# Patient Record
Sex: FEMALE | Race: WHITE | NOT HISPANIC OR LATINO | Employment: FULL TIME | URBAN - METROPOLITAN AREA
[De-identification: names, ages, dates, MRNs, and addresses within clinical notes are randomized per-mention and may not be internally consistent; named-entity substitution may affect disease eponyms.]

---

## 2017-01-09 ENCOUNTER — ALLSCRIPTS OFFICE VISIT (OUTPATIENT)
Dept: OTHER | Facility: OTHER | Age: 22
End: 2017-01-09

## 2017-03-27 ENCOUNTER — ALLSCRIPTS OFFICE VISIT (OUTPATIENT)
Dept: OTHER | Facility: OTHER | Age: 22
End: 2017-03-27

## 2017-06-09 ENCOUNTER — ALLSCRIPTS OFFICE VISIT (OUTPATIENT)
Dept: OTHER | Facility: OTHER | Age: 22
End: 2017-06-09

## 2017-06-09 DIAGNOSIS — N94.6 DYSMENORRHEA: ICD-10-CM

## 2017-06-12 ENCOUNTER — HOSPITAL ENCOUNTER (OUTPATIENT)
Dept: ULTRASOUND IMAGING | Facility: HOSPITAL | Age: 22
Discharge: HOME | End: 2017-06-12
Attending: OBSTETRICS & GYNECOLOGY
Payer: COMMERCIAL

## 2017-06-12 DIAGNOSIS — N94.6 DYSMENORRHEA: ICD-10-CM

## 2017-06-12 PROCEDURE — 76830 TRANSVAGINAL US NON-OB: CPT

## 2017-06-12 PROCEDURE — 76856 US EXAM PELVIC COMPLETE: CPT

## 2017-06-15 ENCOUNTER — GENERIC CONVERSION - ENCOUNTER (OUTPATIENT)
Dept: OTHER | Facility: OTHER | Age: 22
End: 2017-06-15

## 2017-07-11 ENCOUNTER — ALLSCRIPTS OFFICE VISIT (OUTPATIENT)
Dept: OTHER | Facility: OTHER | Age: 22
End: 2017-07-11

## 2017-08-22 ENCOUNTER — ALLSCRIPTS OFFICE VISIT (OUTPATIENT)
Dept: OTHER | Facility: OTHER | Age: 22
End: 2017-08-22

## 2017-08-31 ENCOUNTER — ALLSCRIPTS OFFICE VISIT (OUTPATIENT)
Dept: OTHER | Facility: OTHER | Age: 22
End: 2017-08-31

## 2017-12-31 NOTE — PROGRESS NOTES
Assessment    1  Migraine with aura and without status migrainosus, not intractable (346 00) (G43 109)   2  Hemiplegic migraine (346 30) (G43 409)    Plan  Hemiplegic migraine    · Follow-up visit in 6 months Evaluation and Treatment  Follow-up  Status: Complete   Done: [id number]   Ordered; For: Hemiplegic migraine; Ordered By: Consuelo Reyes Performed:  Due: [id number]; Last Updated By: Lana Hansen; 8/31/2017 2:53:52 PM    Discussion/Summary  Discussion Summary:   Pt reports that she is doing well  She will continue daily magnesium  She will follow-up in 6 months  Headache [facility]:   The patient was counseled regarding; There are no changes in medication management  Patient education was completed today and we also discussed precautions for rebound headaches  When patient has a moderate to severe headache, they should seek rest, initiate relaxation and apply cold compresses to the head  Also recommended to the patient :  1  Maintain regular sleep schedule  2  Limit over the counter medications  (No more than 3 times a week)  3  Maintain headache diary  4  Limit caffeine to 1-2 cups a day or less  5  Avoid dietary trigger  (list given to the patient and reviewed with them)  6  Patient is to have regular frequent meals to prevent headache onset  Chief Complaint  Chief Complaint Free Text Note Form: Patient is here today for a follow up for her headaches      History of Present Illness  HPI: We had the pleasure of evaluating Gary Car in neurological consultation today  As you know she is a 22year old right handed female  She is in school for mechanical engineering  She is taking daily magnesium and tolerating it well  She has had less headaches but is not in school and has less stress  She is concerned that the headaches will increase once school starts up again      Any family history of aneurysms - no  Family history of migraine headaches - mother and maternal grandmother     What is your current pain level - 0/10   Headaches started at what age - 14years of age headaches started and started her menstruation at age 7years of age  Accident or injury prior to onset of headaches - 12years of age, she was unresponsive for less than 2-3 minutes  She was seen in the ER and had a CT head  She was vomiting at the time  It seems all her symptoms improved in 2-3 days and did not play sports during that time  How often do the headaches occur - <1 migraine per month  What time of the day do the headaches start - usually in the morning when she wakes up  How long do the headaches last - 1-2 hours to all day at time  Are you ever headache free - yes  Where are they located - right orbital   What is the intensity of pain - 5/10  Any warning prior to headache and how long do they last - with every headache she has warning  Typical it is bilateral hand numbness lasting 20 to 30 minutes  - Visual symptoms which start with loss of vision on the right side of her visual field on the right eye only and at time moves to the left side  This occurred more often when she was younger    - Her typically aura now consist of hand numbness with speech problem (difficulty expressing herself and trouble getting her words out) which last for 20 to 30 minutes  - In college she had one event with left side of the face was numbness and this lasted for 20 minutes and then she had a headache  - Aug of 2016 she has numbness with cramps on her hands  May of 2016- had had numbness and had difficulty writing her email  States was using strange long words and had hard time expressing herself  When symptoms subsided she could not understand what she was writing  This lasted for about 20 minutes to half an hours and then the headache started     Describe your usual headache - Throbbing, aching and stabbing  Associated symptoms:   - Decrease of appetite, nausea, vomiting, diarrhea  - Photophobia  - Problem with concentration  - light-headed or dizzy, stiff or sore neck,   - prefer to be alone and in a dark room, unable to work  Headache are worse if the patient: cough, sneeze, bending over  Headache trigger: Fatigue, Stress/Tension, exercise, lack of sleep, dehydration  Are you current pregnant or planning on getting pregnant? no  What time of the year do headaches occur more frequently - varies  Have you seen someone else for headaches or pain - pcp  Have you had trigger point injection performed and how often - no  Have you had Botox injection performed and how often - no  Have you had epidural injections or transforaminal injections performed - no  What medications do you take or have you taken for your headaches? PREVENTIVE: Magnesium  ABORTIVE: Advil  Non-Medical/Alternative Treatments used in the past for headaches: none     Reviewed PMH, PSH, SH, FH and ROS  Review of Systems  Neurological ROS:   Constitutional: no fever, no chills, no recent weight gain, no recent weight loss, no complaints of feeling tired, no changes in appetite  HEENT:  no sinus problems, not feeling congested, no blurred vision, no dryness of the eyes, no eye pain, no hearing loss, no tinnitus, no mouth sores, no sore throat, no hoarseness, no dysphagia, no masses, no bleeding  Cardiovascular:  no chest pain or pressure, no palpitations present, the heart rate was not rapid or irregular, no swelling in the arms or legs, no poor circulation  Respiratory:  no unusual or persistant cough, no shortness of breath with or without exertion  Gastrointestinal:  no nausea, no vomiting, no diarrhea, no abdominal pain, no changes in bowel habits, no melena, no loss of bowel control  Genitourinary:  no incontinence, no feelings of urinary urgency, no increase in frequency, no urinary hesitancy, no dysuria, no hematuria     Musculoskeletal:  no arthralgias, no myalgias, no immobility or loss of function, no head/neck/back pain, no pain while walking  Integumentary  no masses, no rash, no skin lesions, no livedo reticularis  Psychiatric:  no anxiety, no depression, no mood swings, no psychiatric hospitalizations, no sleep problems  Endocrine  no unusual weight loss or gain, no excessive urination, no excessive thirst, no hair loss or gain, no hot or cold intolerance, no menstrual period change or irregularity, no loss of sexual ability or drive, no erection difficulty, no nipple discharge  Hematologic/Lymphatic:  no unusual bleeding, no tendency for easy bruising, no clotting skin or lumps  Neurological General: headache and lightheadedness  Neurological Mental Status: memory problems   no confusion, no mood swings, no alteration or loss of consciousness, no difficulty expressing/understanding speech, no memory problems  Neurological Cranial Nerves: loss of vision, facial numbness or weakness, vertigo or dizziness, slurred speech and left side at times both sides  Neurological Motor findings include:  no tremor, no twitching, no cramping(pre/post exercise), no atrophy  Neurological Coordination:  no unsteadiness, no vertigo or dizziness, no clumsiness, no problems reaching for objects  Neurological Sensory: numbness, tingling and tinglings arms, hands and bilat feet  Neurological Gait:  no difficulty walking, not falling to one side, no sensation of being pushed, has not had falls  ROS Reviewed:   ROS reviewed  Active Problems    1  Acne (706 1) (L70 9)   2  Allergic rhinitis (477 9) (J30 9)   3  Dysfunctional uterine bleeding (626 8) (N93 8)   4  Dysmenorrhea (625 3) (N94 6)   5  Encounter for IUD insertion (V25 11) (Z30 430)   6  Hemiplegic migraine (346 30) (G43 409)   7  Migraine with aura and without status migrainosus, not intractable (346 00) (G43 109)   8  Need for prophylactic vaccination and inoculation against influenza (V04 81) (Z23)   9  Well adult exam (V70 0) (Z00 00)    Past Medical History    1   History of acute sinusitis (V12 69) (Z87 09)    Surgical History    1  Denied: History Of Prior Surgery    Family History  Mother    1  Family history of migraine headaches (V17 2) (Z82 0)  Family History    2  Family history of migraine headaches (V17 2) (Z82 0)    Social History    · Caffeine use (V49 89) (F15 90)   · College student   · Completed 12th grade   · Denied: History of drug use   · Lives with family   · Never a smoker   · Never smoked cigarettes (V49 89) (Z78 9)   · No alcohol use   · Single    Current Meds   1  Magnesium 250 MG Oral Tablet; TAKE 1 TABLET DAILY; Therapy: (Recorded:32Qnp4331) to Recorded    Allergies    1  No Known Drug Allergies    Vitals  Signs   Recorded: 14Uwn7901 02:30PM   Heart Rate: 80  Respiration: 18  Systolic: 844  Diastolic: 73  Height: 5 ft 6 in  Weight: 124 lb 1 oz  BMI Calculated: 20 02  BSA Calculated: 1 63  O2 Saturation: 98    Physical Exam    Constitutional   General appearance: No acute distress, well appearing and well nourished  Eyes   Ophthalmoscopic examination: Vision is grossly normal  Gross visual field testing by confrontation shows no abnormalities  EOMI in both eyes  Conjunctivae clear  Eyelids normal palpebral fissures equal  Orbits exhibit normal position  No discharge from the eyes  PERRL  Musculoskeletal   Gait and station: Normal gait, stance and balance  Muscle strength: Normal strength throughout  Muscle tone: No atrophy, abnormal movements, flaccidity, cogwheeling or spasticity  Involuntary movements: None observed      Neurologic   Orientation to person, place, and time: Normal     2nd cranial nerve: Normal     3rd, 4th, and 6th cranial nerves: Normal     5th cranial nerve: Normal     7th cranial nerve: Normal     8th cranial nerve: Normal     9th cranial nerve: Normal     11th cranial nerve: Normal     12th cranial nerve: Normal     Sensation: Normal     Reflexes: Normal     Coordination: Normal        Future Appointments    Date/Time Provider Specialty Site   06/12/2018 09:00 AM Isael Payton DO Family Medicine [facility] [place]   03/28/2018 09:45 AM Kris Leon, [facility] Neurology [email or web address]   03/29/2018 01:40 PM LEXY Warren   Obstetrics/Gynecology [facility] OB     Signatures   Electronically signed by : Roxane Starr [facility]; Sep  6 2017  9:03AM EST                       (Author)

## 2018-01-10 NOTE — MISCELLANEOUS
Message   Recorded as Task   Date: 06/12/2017 09:09 AM, Created By: Camille Hatfield   Task Name: Care Coordination   Assigned To: Jareth Portillo   Regarding Patient: Kalli Singh, Status: Active   CommentVallimarcial Senate - 12 Jun 2017 9:09 AM     TASK CREATED  mirena iud reimbursement paperwork   Jareth Portillo - 14 Jun 2017 8:27 AM     TASK EDITED  Fax Mirena form today will check when I come back  Jareth Portillo - 21 JBL 9816 8:27 AM     TASK EDITED   Jareth Portillo - 27 Jun 2017 9:54 AM     TASK EDITED  Saturnino Montes De Oca said is a buy and bill please call pt and schedule an appt  Active Problems    1  Acne (706 1) (L70 9)   2  Allergic rhinitis (477 9) (J30 9)   3  Dysfunctional uterine bleeding (626 8) (N93 8)   4  Dysmenorrhea (625 3) (N94 6)   5  Hemiplegic migraine (346 30) (G43 409)   6  Migraine with aura and without status migrainosus, not intractable (346 00) (G43 109)   7  Need for prophylactic vaccination and inoculation against influenza (V04 81) (Z23)   8  Well adult exam (V70 0) (Z00 00)    Current Meds   1  Naproxen 500 MG Oral Tablet; TAKE 1 TABLET 3 TIMES DAILY AS NEEDED; Therapy: 27OUO5596 to (Evaluate:29Jun2017)  Requested for: 45AUK0784; Last   Rx:09Jun2017 Ordered    Allergies    1   No Known Drug Allergies    Signatures   Electronically signed by : Michelle Garay MA; Jun 27 2017  9:54AM EST                       (Author)

## 2018-01-12 VITALS
WEIGHT: 128 LBS | SYSTOLIC BLOOD PRESSURE: 100 MMHG | DIASTOLIC BLOOD PRESSURE: 70 MMHG | BODY MASS INDEX: 20.57 KG/M2 | HEIGHT: 66 IN

## 2018-01-12 VITALS — DIASTOLIC BLOOD PRESSURE: 74 MMHG | BODY MASS INDEX: 19.99 KG/M2 | WEIGHT: 122 LBS | SYSTOLIC BLOOD PRESSURE: 110 MMHG

## 2018-01-12 VITALS
SYSTOLIC BLOOD PRESSURE: 110 MMHG | DIASTOLIC BLOOD PRESSURE: 70 MMHG | BODY MASS INDEX: 19.77 KG/M2 | HEIGHT: 66 IN | WEIGHT: 123 LBS

## 2018-01-13 VITALS
OXYGEN SATURATION: 98 % | WEIGHT: 124.06 LBS | HEIGHT: 66 IN | HEART RATE: 80 BPM | SYSTOLIC BLOOD PRESSURE: 105 MMHG | BODY MASS INDEX: 19.94 KG/M2 | RESPIRATION RATE: 18 BRPM | DIASTOLIC BLOOD PRESSURE: 73 MMHG

## 2018-01-13 VITALS
BODY MASS INDEX: 20.33 KG/M2 | OXYGEN SATURATION: 98 % | SYSTOLIC BLOOD PRESSURE: 100 MMHG | DIASTOLIC BLOOD PRESSURE: 78 MMHG | HEART RATE: 74 BPM | WEIGHT: 124.03 LBS

## 2018-01-13 VITALS
BODY MASS INDEX: 20.57 KG/M2 | SYSTOLIC BLOOD PRESSURE: 110 MMHG | DIASTOLIC BLOOD PRESSURE: 80 MMHG | HEIGHT: 66 IN | WEIGHT: 128 LBS

## 2018-03-26 PROBLEM — G43.409 HEMIPLEGIC MIGRAINE: Status: ACTIVE | Noted: 2017-01-09

## 2018-03-28 ENCOUNTER — OFFICE VISIT (OUTPATIENT)
Dept: NEUROLOGY | Facility: CLINIC | Age: 23
End: 2018-03-28
Payer: COMMERCIAL

## 2018-03-28 VITALS
HEART RATE: 66 BPM | WEIGHT: 128 LBS | BODY MASS INDEX: 20.66 KG/M2 | DIASTOLIC BLOOD PRESSURE: 78 MMHG | SYSTOLIC BLOOD PRESSURE: 112 MMHG

## 2018-03-28 DIAGNOSIS — G43.409 HEMIPLEGIC MIGRAINE WITHOUT STATUS MIGRAINOSUS, NOT INTRACTABLE: ICD-10-CM

## 2018-03-28 DIAGNOSIS — G43.109 MIGRAINE WITH AURA AND WITHOUT STATUS MIGRAINOSUS, NOT INTRACTABLE: Primary | ICD-10-CM

## 2018-03-28 PROCEDURE — 99213 OFFICE O/P EST LOW 20 MIN: CPT | Performed by: PHYSICIAN ASSISTANT

## 2018-03-28 RX ORDER — MULTIVITAMIN WITH IRON
1 TABLET ORAL DAILY
COMMUNITY

## 2018-03-28 NOTE — ASSESSMENT & PLAN NOTE
Pt reports that she is doing well  She is no longer having auras and only has a migraine every 3 months  Advil is effective for treatment  She will continue daily magnesium for migraine prevention  She will follow-up as needed

## 2018-03-28 NOTE — PATIENT INSTRUCTIONS
Migraine with aura and without status migrainosus, not intractable  Pt reports that she is doing well  She is no longer having auras and only has a migraine every 3 months  Advil is effective for treatment  She will continue daily magnesium for migraine prevention  She will follow-up as needed

## 2018-03-28 NOTE — PROGRESS NOTES
Patient ID: Geoluz maria Snyder is a 25 y o  female  Assessment/Plan:    Migraine with aura and without status migrainosus, not intractable  Pt reports that she is doing well  She is no longer having auras and only has a migraine every 3 months  Advil is effective for treatment  She will continue daily magnesium for migraine prevention  She will follow-up as needed  Problem List Items Addressed This Visit     Migraine with aura and without status migrainosus, not intractable - Primary     Pt reports that she is doing well  She is no longer having auras and only has a migraine every 3 months  Advil is effective for treatment  She will continue daily magnesium for migraine prevention  She will follow-up as needed  Hemiplegic migraine             Subjective:    HPI    We had the pleasure of evaluating Wisam Joy in neurological consultation today  As you know she is a 25year old right handed female  She is in school for mechanical engineering  She is taking daily magnesium and tolerating it well  She has had less headaches and is back in class without an increase  Any family history of aneurysms - no  Family history of migraine headaches - mother and maternal grandmother     What is your current pain level - 0/10   Headaches started at what age - 14years of age headaches started and started her menstruation at age 7years of age  Accident or injury prior to onset of headaches - 12years of age, she was unresponsive for less than 2-3 minutes  She was seen in the ER and had a CT head  She was vomiting at the time  It seems all her symptoms improved in 2-3 days and did not play sports during that time     How often do the headaches occur - 1 migraine every 3 months  What time of the day do the headaches start - usually in the middle of the day  How long do the headaches last - 1-2 hours to all day at time  Are you ever headache free - yes  Where are they located - frontal  What is the intensity of pain - 5-6/10  Any warning prior to headache and how long do they last - aura has not occurred with the most recent migraines  Previously pt reported:   - Visual symptoms which start with loss of vision on the right side of her visual field on the right eye only and at time moves to the left side  This occurred more often when she was younger    - Her typically aura now consist of hand numbness with speech problem (difficulty expressing herself and trouble getting her words out) which last for 20 to 30 minutes  - In college she had one event with left side of the face was numbness and this lasted for 20 minutes and then she had a headache  - Aug of 2016 she has numbness with cramps on her hands  May of 2016- had had numbness and had difficulty writing her email  States was using strange long words and had hard time expressing herself  When symptoms subsided she could not understand what she was writing  This lasted for about 20 minutes to half an hours and then the headache started  Describe your usual headache - Throbbing, aching and stabbing  Associated symptoms:   - Decrease of appetite, nausea, vomiting, diarrhea  - Photophobia  - Problem with concentration  - light-headed or dizzy, stiff or sore neck,   - prefer to be alone and in a dark room, unable to work  Headache are worse if the patient: cough, sneeze, bending over  Headache trigger: Fatigue, Stress/Tension, exercise, lack of sleep, dehydration  Are you current pregnant or planning on getting pregnant? no  What time of the year do headaches occur more frequently - varies  Have you seen someone else for headaches or pain - pcp  Have you had trigger point injection performed and how often - no  Have you had Botox injection performed and how often - no  Have you had epidural injections or transforaminal injections performed - no  What medications do you take or have you taken for your headaches?    PREVENTIVE: Magnesium  ABORTIVE: Advil  Non-Medical/Alternative Treatments used in the past for headaches: none    The following portions of the patient's history were reviewed and updated as appropriate: allergies, current medications, past family history, past medical history, past social history, past surgical history and problem list          Objective:    Blood pressure 112/78, pulse 66, weight 58 1 kg (128 lb)  Physical Exam   Eyes: EOM are normal  Pupils are equal, round, and reactive to light  Neurological: She has normal strength and normal reflexes  Gait normal    Psychiatric: Her speech is normal    Vitals reviewed  Neurological Exam    Mental Status  The patient is alert and oriented to person, place, time, and situation  Her recent and remote memory are normal  Her speech is normal  Her language is fluent with no aphasia  Cranial Nerves    CN II: The patient's visual acuity and visual fields are normal   CN III, IV, VI: The patient's pupils are equally round and reactive to light and ocular movements are normal   CN V: The patient has normal facial sensation  CN VII:  The patient has symmetric facial movement  CN VIII:  The patient's hearing is normal   CN IX, X: The patient has symmetric palate movement and normal gag reflex  CN XI: The patient's shoulder shrug strength is normal   CN XII: The patient's tongue is midline without atrophy or fasciculations  Motor   Her strength is 5/5 throughout all four extremities  Sensory  The patient's sensation is normal in all four extremities  Reflexes  Deep tendon reflexes are 2+ and symmetric in all four extremities with downgoing toes bilaterally  Gait and Coordination  The patient has normal gait and station  ROS:    Review of Systems   Constitutional: Negative  HENT: Negative  Eyes: Negative  Respiratory: Negative  Cardiovascular: Negative  Gastrointestinal: Negative  Endocrine: Negative  Genitourinary: Negative      Musculoskeletal: Negative  Skin: Negative  Allergic/Immunologic: Negative  Neurological: Positive for headaches  Hematological: Negative  Psychiatric/Behavioral: Negative

## 2018-03-29 ENCOUNTER — ANNUAL EXAM (OUTPATIENT)
Dept: OBGYN CLINIC | Facility: CLINIC | Age: 23
End: 2018-03-29
Payer: COMMERCIAL

## 2018-03-29 VITALS
HEIGHT: 66 IN | BODY MASS INDEX: 19.61 KG/M2 | DIASTOLIC BLOOD PRESSURE: 60 MMHG | WEIGHT: 122 LBS | SYSTOLIC BLOOD PRESSURE: 100 MMHG

## 2018-03-29 DIAGNOSIS — Z01.419 ENCOUNTER FOR ANNUAL ROUTINE GYNECOLOGICAL EXAMINATION: Primary | ICD-10-CM

## 2018-03-29 PROCEDURE — S0612 ANNUAL GYNECOLOGICAL EXAMINA: HCPCS | Performed by: OBSTETRICS & GYNECOLOGY

## 2018-03-29 PROCEDURE — G0145 SCR C/V CYTO,THINLAYER,RESCR: HCPCS | Performed by: OBSTETRICS & GYNECOLOGY

## 2018-03-29 NOTE — PROGRESS NOTES
Assessment/Plan:    Encounter for annual routine gynecological examination  -     Liquid-based pap, screening    Other orders  -     levonorgestrel (MIRENA) 20 MCG/24HR IUD; 1 each by Intrauterine route once        Subjective:      Patient ID: Isael Aguiar is a 25 y o  female  26-year-old here for yearly exam   She is finishing up her 3rd year at [employer]  She has an IUD in place to help with her bleeding  She notes that her cycles are slightly longer but on the lighter side  Her headaches mother neurologic issues have completely resolved with the discontinuation of birth control pills  She is curious to know if she can use a diva cup for her menstrual cycle which I told her she could  She denies issues with urination or moving her bowels  She has no breast concerns today  She has no pelvic pain today  She has occasional mild cramping pre menstrual cycle  She has never been sexually active  The following portions of the patient's history were reviewed and updated as appropriate: allergies, current medications, past family history, past medical history, past social history, past surgical history and problem list     Review of Systems   Respiratory: Negative for shortness of breath  Cardiovascular: Negative for chest pain  Gastrointestinal: Negative for abdominal pain, constipation and diarrhea  Genitourinary: Negative for difficulty urinating, menstrual problem, pelvic pain, vaginal bleeding, vaginal discharge and vaginal pain  Neurological: Negative for dizziness and headaches  Objective:      /60   Ht 5' 5 5" (1 664 m)   Wt 55 3 kg (122 lb)   LMP 03/16/2018   BMI 19 99 kg/m²          Physical Exam   Constitutional: She is oriented to person, place, and time  She appears well-developed and well-nourished  Pulmonary/Chest: No respiratory distress  Right breast exhibits no inverted nipple, no mass, no nipple discharge, no skin change and no tenderness   Left breast exhibits no inverted nipple, no mass, no nipple discharge, no skin change and no tenderness  Tattoo under left breast   Abdominal: Soft  There is no tenderness  Genitourinary: Vagina normal and uterus normal  There is no rash or lesion on the right labia  There is no rash or lesion on the left labia  Uterus is not enlarged and not tender  Cervix exhibits friability  Cervix exhibits no motion tenderness  Right adnexum displays no mass and no tenderness  Left adnexum displays no mass and no tenderness  No bleeding in the vagina  No signs of injury around the vagina  No vaginal discharge found  Musculoskeletal: Normal range of motion  She exhibits edema  Neurological: She is alert and oriented to person, place, and time  Skin: Skin is warm and dry  Psychiatric: She has a normal mood and affect  Vitals reviewed

## 2018-04-04 LAB
LAB AP GYN PRIMARY INTERPRETATION: NORMAL
Lab: NORMAL

## 2018-08-23 ENCOUNTER — CLINICAL SUPPORT (OUTPATIENT)
Dept: FAMILY MEDICINE CLINIC | Facility: CLINIC | Age: 23
End: 2018-08-23
Payer: COMMERCIAL

## 2018-08-23 DIAGNOSIS — Z23 NEED FOR VACCINATION AGAINST HUMAN PAPILLOMAVIRUS: Primary | ICD-10-CM

## 2018-08-23 PROCEDURE — 90651 9VHPV VACCINE 2/3 DOSE IM: CPT

## 2018-08-23 PROCEDURE — 90471 IMMUNIZATION ADMIN: CPT

## 2019-02-12 ENCOUNTER — TELEPHONE (OUTPATIENT)
Dept: NEUROLOGY | Facility: CLINIC | Age: 24
End: 2019-02-12

## 2019-02-12 NOTE — TELEPHONE ENCOUNTER
Patient called in tearfully stating she has hemiplegic migraines and she is experiencing one presently  She reports she has an examination today and she is requesting a note stating she is under our care excusing her from missing the exam      When did migraine start? 30 mins ago  Location/Description: no pain at this point, usually starts with numbness of right hand and right side of face, difficulty expressing words,    Pain scale: 0  Associated symptoms:"split" in right eye vision  Precipitating factors: usually not getting enough rest or drinking enough water  Alleviating factors: increases fluid intake, resting, and taking advil usually resolves it  What medications have you tried for this migraine headache? 2 tabs of Advil     Current migraine medications are confirmed as:  Magnesium 250mg daily    Patient is not requesting any medication at this point as typically resolves with above alleviating factors  Patient advised that we would recommend she seek emergency care should her symptoms worsen or not improve  She verbalized clear understanding  Please advise if okay to write letter stating:     "To Whom it May Concern,     Deshawn Castro is under the care of [facility]  Please excuse Austen Fabys from her examination on 2/12/19 due to her medical condition "    Patient unaware of any fax number that letter can be sent to at this time  Will look into obtaining fax number but requests we send via mail to below address if approved by provider  Requests provider signature      Current Address:   [address] [address], [address] [place], [address]

## 2019-02-12 NOTE — LETTER
To whom it may concern,     Brendan Knapp, 1995, is under the care of [facility] [address] Neurology  Please excuse Rachel Islas from her examination on 2/12/19 due to her medical condition       Sincerely,        Daksha Hobbs MD

## 2019-02-18 NOTE — TELEPHONE ENCOUNTER
Letter drafted for signature  Please mail to address in message and let patient know once complete  Thank you!

## 2019-07-17 ENCOUNTER — ANNUAL EXAM (OUTPATIENT)
Dept: OBGYN CLINIC | Facility: CLINIC | Age: 24
End: 2019-07-17
Payer: COMMERCIAL

## 2019-07-17 VITALS
HEIGHT: 66 IN | DIASTOLIC BLOOD PRESSURE: 60 MMHG | BODY MASS INDEX: 21.69 KG/M2 | WEIGHT: 135 LBS | SYSTOLIC BLOOD PRESSURE: 114 MMHG

## 2019-07-17 DIAGNOSIS — Z01.419 ENCOUNTER FOR GYNECOLOGICAL EXAMINATION (GENERAL) (ROUTINE) WITHOUT ABNORMAL FINDINGS: Primary | ICD-10-CM

## 2019-07-17 PROCEDURE — S0612 ANNUAL GYNECOLOGICAL EXAMINA: HCPCS | Performed by: OBSTETRICS & GYNECOLOGY

## 2019-07-17 NOTE — PROGRESS NOTES
Elidia Morning  1995    CC:  Yearly exam    S:  26 y o  female here for yearly exam   She gets occasional light bleeding with her Mirena, but nothing heavy or bothersome  She is not sexually active  She does not request STD testing today  One more year of school - , working as Co-Op at [employer]  Hopes to move back to [facility] when she graduates       Last Pap 2018 - normal cytology    Current Outpatient Medications:     levonorgestrel (MIRENA) 20 MCG/24HR IUD, 1 each by Intrauterine route once, Disp: , Rfl:     Magnesium 250 MG TABS, Take 1 tablet by mouth daily, Disp: , Rfl:   Social History     Socioeconomic History    Marital status: Single     Spouse name: Not on file    Number of children: Not on file    Years of education: college student    Highest education level: Not on file   Occupational History    Not on file   Social Needs    Financial resource strain: Not on file    Food insecurity:     Worry: Not on file     Inability: Not on file    Transportation needs:     Medical: Not on file     Non-medical: Not on file   Tobacco Use    Smoking status: Never Smoker    Smokeless tobacco: Never Used   Substance and Sexual Activity    Alcohol use: No    Drug use: No    Sexual activity: Never     Birth control/protection: IUD     Comment: Mirena   Lifestyle    Physical activity:     Days per week: Not on file     Minutes per session: Not on file    Stress: Not on file   Relationships    Social connections:     Talks on phone: Not on file     Gets together: Not on file     Attends Protestant service: Not on file     Active member of club or organization: Not on file     Attends meetings of clubs or organizations: Not on file     Relationship status: Not on file    Intimate partner violence:     Fear of current or ex partner: Not on file     Emotionally abused: Not on file     Physically abused: Not on file     Forced sexual activity: Not on file   Other Topics Concern    Not on file   Social History Narrative    Caffeine use    Lives with family     Family History   Problem Relation Age of Onset   Harvey Roman Migraines Mother     Migraines Family     Migraines Sister      Past Medical History:   Diagnosis Date    Migraine          O:  Blood pressure 114/60, height 5' 6" (1 676 m), weight 61 2 kg (135 lb), last menstrual period 06/03/2019  Patient appears well and is not in distress  Neck is supple without masses  Breasts are symmetrical without mass, tenderness, nipple discharge, skin changes or adenopathy  Abdomen is soft and nontender without masses  External genitals are normal without lesions or rashes  Vagina is normal without discharge or bleeding  Cervix is normal without discharge or lesion IUD strings seen  Uterus is normal, mobile, nontender without palpable mass  Adnexa are normal, nontender, without palpable mass  A:  Yearly exam      P:  RTO one year for yearly exam or sooner as needed

## 2020-09-29 ENCOUNTER — ANNUAL EXAM (OUTPATIENT)
Dept: OBGYN CLINIC | Facility: CLINIC | Age: 25
End: 2020-09-29
Payer: COMMERCIAL

## 2020-09-29 VITALS
DIASTOLIC BLOOD PRESSURE: 72 MMHG | TEMPERATURE: 98.6 F | WEIGHT: 132 LBS | SYSTOLIC BLOOD PRESSURE: 124 MMHG | BODY MASS INDEX: 21.31 KG/M2

## 2020-09-29 DIAGNOSIS — Z97.5 IUD (INTRAUTERINE DEVICE) IN PLACE: ICD-10-CM

## 2020-09-29 DIAGNOSIS — Z01.419 ENCOUNTER FOR GYNECOLOGICAL EXAMINATION (GENERAL) (ROUTINE) WITHOUT ABNORMAL FINDINGS: Primary | ICD-10-CM

## 2020-09-29 PROCEDURE — S0612 ANNUAL GYNECOLOGICAL EXAMINA: HCPCS | Performed by: NURSE PRACTITIONER

## 2020-09-29 NOTE — ASSESSMENT & PLAN NOTE
Mirena IUD present since 2017 for management of menorrhagia in the context of h/o migraine with aura component  Pt likes the device and desires to continue  She does perform occasional self string check and declines pelvic exam today  Reviewed sx to report and reasons to call  She is aware condoms are recommended for STI prevention if sexual activity is initiated

## 2020-09-29 NOTE — PROGRESS NOTES
Assessment/Plan:    Encounter for gynecological examination (general) (routine) without abnormal findings  Normal findings on routine annual gyn exam  Recommended monthly SBE, annual CBE  Reviewed ASCCP guidelines and pap noted to be up to date  The patient reports she has completed Gardasil series  STI testing was offered and declined at this time, as she reports virginal status; the patient is aware that condoms are recommended for all sexual contact for prevention of STI  Reviewed diet/activity recommendations and reasons to call  F/u in one year for routine annual gyn exam or sooner PRN  IUD (intrauterine device) in place  Mirena IUD present since 2017 for management of menorrhagia in the context of h/o migraine with aura component  Pt likes the device and desires to continue  She does perform occasional self string check and declines pelvic exam today  Reviewed sx to report and reasons to call  She is aware condoms are recommended for STI prevention if sexual activity is initiated  Diagnoses and all orders for this visit:    Encounter for gynecological examination (general) (routine) without abnormal findings    IUD (intrauterine device) in place          Subjective:      Patient ID: Jhoana Victor is a 26 y o  female  This patient presents for routine annual gyn exam    Medically stable  H/o migraine with aura  Mirena present since 2017 for management of menorrhagia  She does still have occasional period - shorter and lighter than prior to IUD insert  Likes the device and desires to continue  She denies acute gyn complaints  She denies pelvic pain, breast concerns, abnormal discharge, bowel/bladder dysfunction, depression/anxiety     Single and virginal  Denies STI concerns     Recently graduated from [employer] and looking for a job      The following portions of the patient's history were reviewed and updated as appropriate: allergies, current medications, past family history, past medical history, past social history, past surgical history and problem list     Review of Systems   Constitutional: Negative  Respiratory: Negative  Cardiovascular: Negative  Gastrointestinal: Negative  Genitourinary: Negative  Musculoskeletal: Negative  Skin: Negative  Neurological: Negative  Psychiatric/Behavioral: Negative  Objective:      /72   Temp 98 6 °F (37 °C)   Wt 59 9 kg (132 lb)   LMP 09/14/2020   BMI 21 31 kg/m²          Physical Exam  Constitutional:       Appearance: She is well-developed  HENT:      Head: Normocephalic and atraumatic  Eyes:      Pupils: Pupils are equal, round, and reactive to light  Neck:      Musculoskeletal: Normal range of motion and neck supple  Thyroid: No thyromegaly  Cardiovascular:      Rate and Rhythm: Normal rate and regular rhythm  Heart sounds: Normal heart sounds  Pulmonary:      Effort: Pulmonary effort is normal  No respiratory distress  Breath sounds: Normal breath sounds  No wheezing or rales  Chest:      Chest wall: No mass, deformity or tenderness  Breasts: Breasts are symmetrical          Right: No inverted nipple, mass, nipple discharge, skin change or tenderness  Left: No inverted nipple, mass, nipple discharge, skin change or tenderness  Abdominal:      General: There is no distension  Palpations: Abdomen is soft  There is no hepatomegaly, splenomegaly or mass  Tenderness: There is no abdominal tenderness  There is no guarding or rebound  Genitourinary:        Musculoskeletal: Normal range of motion  Lymphadenopathy:      Cervical: No cervical adenopathy  Skin:     General: Skin is warm and dry  Findings: No rash  Nails: There is no clubbing  Neurological:      Mental Status: She is alert and oriented to person, place, and time  Cranial Nerves: No cranial nerve deficit     Psychiatric:         Speech: Speech normal          Behavior: Behavior normal  Thought Content:  Thought content normal          Judgment: Judgment normal

## 2020-09-29 NOTE — ASSESSMENT & PLAN NOTE
Normal findings on routine annual gyn exam  Recommended monthly SBE, annual CBE  Reviewed ASCCP guidelines and pap noted to be up to date  The patient reports she has completed Gardasil series  STI testing was offered and declined at this time, as she reports virginal status; the patient is aware that condoms are recommended for all sexual contact for prevention of STI  Reviewed diet/activity recommendations and reasons to call  F/u in one year for routine annual gyn exam or sooner PRN

## 2021-04-08 DIAGNOSIS — Z23 ENCOUNTER FOR IMMUNIZATION: ICD-10-CM

## 2021-10-14 ENCOUNTER — ANNUAL EXAM (OUTPATIENT)
Dept: OBGYN CLINIC | Facility: CLINIC | Age: 26
End: 2021-10-14
Payer: COMMERCIAL

## 2021-10-14 VITALS
DIASTOLIC BLOOD PRESSURE: 64 MMHG | BODY MASS INDEX: 21.6 KG/M2 | SYSTOLIC BLOOD PRESSURE: 108 MMHG | HEIGHT: 66 IN | WEIGHT: 134.4 LBS

## 2021-10-14 DIAGNOSIS — Z97.5 IUD (INTRAUTERINE DEVICE) IN PLACE: ICD-10-CM

## 2021-10-14 DIAGNOSIS — Z01.419 ENCOUNTER FOR GYNECOLOGICAL EXAMINATION (GENERAL) (ROUTINE) WITHOUT ABNORMAL FINDINGS: Primary | ICD-10-CM

## 2021-10-14 PROCEDURE — G0145 SCR C/V CYTO,THINLAYER,RESCR: HCPCS | Performed by: NURSE PRACTITIONER

## 2021-10-14 PROCEDURE — 99395 PREV VISIT EST AGE 18-39: CPT | Performed by: NURSE PRACTITIONER

## 2021-10-25 LAB
LAB AP GYN PRIMARY INTERPRETATION: NORMAL
Lab: NORMAL

## 2021-11-20 ENCOUNTER — OFFICE VISIT (OUTPATIENT)
Dept: URGENT CARE | Facility: CLINIC | Age: 26
End: 2021-11-20
Payer: COMMERCIAL

## 2021-11-20 VITALS
HEART RATE: 78 BPM | WEIGHT: 136 LBS | HEIGHT: 66 IN | BODY MASS INDEX: 21.86 KG/M2 | TEMPERATURE: 97.3 F | OXYGEN SATURATION: 99 % | SYSTOLIC BLOOD PRESSURE: 133 MMHG | DIASTOLIC BLOOD PRESSURE: 64 MMHG

## 2021-11-20 DIAGNOSIS — H01.001 BLEPHARITIS OF RIGHT UPPER EYELID, UNSPECIFIED TYPE: Primary | ICD-10-CM

## 2021-11-20 PROCEDURE — G0382 LEV 3 HOSP TYPE B ED VISIT: HCPCS | Performed by: NURSE PRACTITIONER

## 2021-11-20 RX ORDER — ERYTHROMYCIN 5 MG/G
0.5 OINTMENT OPHTHALMIC EVERY 8 HOURS SCHEDULED
Qty: 3.5 G | Refills: 0 | Status: SHIPPED | OUTPATIENT
Start: 2021-11-20 | End: 2022-02-09

## 2021-11-30 ENCOUNTER — OFFICE VISIT (OUTPATIENT)
Dept: URGENT CARE | Facility: CLINIC | Age: 26
End: 2021-11-30
Payer: COMMERCIAL

## 2021-11-30 VITALS — OXYGEN SATURATION: 99 % | TEMPERATURE: 97.9 F | HEART RATE: 83 BPM

## 2021-11-30 DIAGNOSIS — Z20.822 ENCOUNTER FOR SCREENING LABORATORY TESTING FOR COVID-19 VIRUS: ICD-10-CM

## 2021-11-30 DIAGNOSIS — J06.9 VIRAL UPPER RESPIRATORY TRACT INFECTION: ICD-10-CM

## 2021-11-30 DIAGNOSIS — A46 ERYSIPELAS: Primary | ICD-10-CM

## 2021-11-30 PROCEDURE — 0241U HB NFCT DS VIR RESP RNA 4 TRGT: CPT | Performed by: NURSE PRACTITIONER

## 2021-11-30 PROCEDURE — G0382 LEV 3 HOSP TYPE B ED VISIT: HCPCS | Performed by: NURSE PRACTITIONER

## 2021-11-30 RX ORDER — CEPHALEXIN 500 MG/1
500 CAPSULE ORAL EVERY 8 HOURS SCHEDULED
Qty: 15 CAPSULE | Refills: 0 | Status: SHIPPED | OUTPATIENT
Start: 2021-11-30 | End: 2021-12-05

## 2021-12-02 LAB
FLUAV RNA RESP QL NAA+PROBE: NEGATIVE
FLUBV RNA RESP QL NAA+PROBE: NEGATIVE
RSV RNA RESP QL NAA+PROBE: NEGATIVE
SARS-COV-2 RNA RESP QL NAA+PROBE: NEGATIVE

## 2022-02-09 ENCOUNTER — OFFICE VISIT (OUTPATIENT)
Dept: FAMILY MEDICINE CLINIC | Facility: CLINIC | Age: 27
End: 2022-02-09
Payer: COMMERCIAL

## 2022-02-09 VITALS
TEMPERATURE: 97.4 F | OXYGEN SATURATION: 99 % | BODY MASS INDEX: 21.57 KG/M2 | DIASTOLIC BLOOD PRESSURE: 82 MMHG | HEART RATE: 107 BPM | SYSTOLIC BLOOD PRESSURE: 122 MMHG | HEIGHT: 66 IN | WEIGHT: 134.2 LBS

## 2022-02-09 DIAGNOSIS — G43.109 MIGRAINE WITH AURA AND WITHOUT STATUS MIGRAINOSUS, NOT INTRACTABLE: ICD-10-CM

## 2022-02-09 DIAGNOSIS — J30.1 SEASONAL ALLERGIC RHINITIS DUE TO POLLEN: ICD-10-CM

## 2022-02-09 DIAGNOSIS — Z00.00 ANNUAL PHYSICAL EXAM: Primary | ICD-10-CM

## 2022-02-09 PROCEDURE — 99204 OFFICE O/P NEW MOD 45 MIN: CPT | Performed by: FAMILY MEDICINE

## 2022-02-09 PROCEDURE — 3008F BODY MASS INDEX DOCD: CPT | Performed by: FAMILY MEDICINE

## 2022-02-09 PROCEDURE — 1036F TOBACCO NON-USER: CPT | Performed by: FAMILY MEDICINE

## 2022-02-09 PROCEDURE — 99385 PREV VISIT NEW AGE 18-39: CPT | Performed by: FAMILY MEDICINE

## 2022-02-09 PROCEDURE — 3725F SCREEN DEPRESSION PERFORMED: CPT | Performed by: FAMILY MEDICINE

## 2022-02-09 RX ORDER — SUMATRIPTAN 50 MG/1
50 TABLET, FILM COATED ORAL ONCE AS NEEDED
Qty: 6 TABLET | Refills: 0 | Status: SHIPPED | OUTPATIENT
Start: 2022-02-09 | End: 2022-04-11 | Stop reason: ALTCHOICE

## 2022-02-09 NOTE — PROGRESS NOTES
Kody Drew 1995 female MRN: [id number]  [address]    Visit to Establish Care: Family Medicine    Assessment/Plan   Smell change  Suspect this is due to chronic rhinitis  Start Zyrtec and Flonase daily  If not improving then would see ENT    Migraines - try sumatriptan  Handout on management    Chest pressure  We discussed reassuring characteristics and exam  She will monitor for recurrence (as it has mostly resolved) and let me know about new characteristics or frequency  ddx includes gerd, pnd, anxiety    BP normal    Jessy Koch was seen today for establish care, physical exam, pressure in chest when lying down, decreased sense of smell and elevated blood pressure at urgent care  Diagnoses and all orders for this visit:    Annual physical exam    Migraine with aura and without status migrainosus, not intractable  -     SUMAtriptan (Imitrex) 50 mg tablet; Take 1 tablet (50 mg total) by mouth once as needed for migraine for up to 1 dose    Seasonal allergic rhinitis due to pollen  -     Ambulatory Referral to Otolaryngology; Future      In addition to the above, the patient was counseled on general preventative health care subjects, including but not limited to:  - Nutrition, healthy weight, aerobic and weight-bearing exercise  - Mental health, social support, and self care  - Advised of the importance of dental hygiene and routine dental visits   - Patient made aware of  services at the office  Future Appointments   Date Time Provider Sadie Olson   10/20/2022  4:40 PM SHERWIN Curran [facility]   2/15/2023  8:30 AM Algie Paget, MD [facility]      Algie Paget, MD  [facility]  2/10/2022      Please be aware that this note contains text that was dictated and there may be errors pertaining to "sound-alike" words during the dictation process  SUBJECTIVE    HPI:  Kody Drew is a 23 y o  female who presented to establish care with this family medicine practice  She has a few concerns:    Chest pressure - started Nov  Happened a couple times  Only occurs when she is laying down before bed, never at any other time  Never with exertion  Does not have associated symptoms like blurred vision, headache, sweating, SOB  She falls asleep and it goes away  Bad sense of smell for years  Noticed more in college  Can't smell unless super strong odor  She hasn't noticed a change to her taste  Hx sinus infections once annually  Allergies in the fall, developed as teen  Doesn't take anything for them, no medications  More year round now  Migraines - started in HS  All women in her family get them  She gets hemiplegic migraines  At worst they're once a month  She gets hands and feet going numb, more L>R  Headache on right side above eyebrown  sometiems left side of face goes numb  Has had tongue numbness  Worse ones can have difficutly speaking and following conversation  Spots in vision  Review of Systems   Constitutional: Negative for activity change, chills and fever  HENT: Negative for congestion, rhinorrhea and sore throat  Smell change   Eyes: Negative for visual disturbance  Respiratory: Positive for chest tightness  Negative for cough, shortness of breath and wheezing  Cardiovascular: Negative for chest pain and palpitations  Gastrointestinal: Negative for abdominal pain, blood in stool, constipation, diarrhea, nausea and vomiting  Genitourinary: Negative for dysuria  Musculoskeletal: Negative for arthralgias and myalgias  Skin: Negative for rash  Neurological: Positive for headaches  Negative for dizziness and weakness  All other systems reviewed and are negative      Historical Information   Past Medical History:   Diagnosis Date    Allergic 2014    Seasonal    Eating disorder [id number]    past, not current    Headache(784 0) 2011    hemiplegic migraines    Migraine     Migraine with aura     Visual impairment 2003    glasses     Past Surgical History:   Procedure Laterality Date    CYST REMOVAL      NO PAST SURGERIES      WISDOM TOOTH EXTRACTION       Family History   Problem Relation Age of Onset   Dinh Noriega Migraines Mother     No Known Problems Father     Migraines Sister     Diabetes Maternal Grandfather     Arthritis Paternal Grandmother         hands, knees    Colon cancer Paternal Grandfather 76    Cancer Paternal Grandfather     Migraines Family     Mental illness Sister         Anxiety    Anxiety disorder Sister     Mental illness Sister         Anxiety    Anxiety disorder Sister     Breast cancer Neg Hx      Social History     Socioeconomic History    Marital status: Single     Spouse name: Not on file    Number of children: Not on file    Years of education: college student    Highest education level: Not on file   Occupational History    Not on file   Tobacco Use    Smoking status: Never Smoker    Smokeless tobacco: Never Used   Vaping Use    Vaping Use: Never used   Substance and Sexual Activity    Alcohol use: Not Currently     Alcohol/week: 0 0 standard drinks     Comment: Only drink at social events    Drug use: Never    Sexual activity: Never     Birth control/protection: I U D       Comment: Mirena   Other Topics Concern    Not on file   Social History Narrative    Caffeine use    Lives with family     Social Determinants of Health     Financial Resource Strain: Not on file   Food Insecurity: Not on file   Transportation Needs: Not on file   Physical Activity: Not on file   Stress: Not on file   Social Connections: Not on file   Intimate Partner Violence: Not on file   Housing Stability: Not on file     OB History        0    Para        Term                AB        Living           SAB        IAB        Ectopic        Multiple        Live Births                   Medications:      Current Outpatient Medications:   levonorgestrel (MIRENA) 20 MCG/24HR IUD, 1 each by Intrauterine route once, Disp: , Rfl:     Magnesium 250 MG TABS, Take 1 tablet by mouth daily  , Disp: , Rfl:     SUMAtriptan (Imitrex) 50 mg tablet, Take 1 tablet (50 mg total) by mouth once as needed for migraine for up to 1 dose, Disp: 6 tablet, Rfl: 0    Allergies   Allergen Reactions    Latex Hives       Most Recent Immunizations   Administered Date(s) Administered    COVID-19 MODERNA VACC 0 5 ML IM 05/14/2021    DTaP 5 06/26/2002    HPV Quadrivalent 01/08/2015    HPV9 08/23/2018    Hep B, adult 06/25/1997    Hib (PRP-OMP) 01/19/1998    INFLUENZA 11/07/2020    IPV 06/26/2002    Influenza Quadrivalent, 6-35 Months IM 09/07/2016    Influenza, seasonal, injectable 09/27/2012    MMR 06/26/2002    Meningococcal, Unknown Serogroups 06/02/2015    Tdap 08/13/2008    Tuberculin Skin Test-PPD Intradermal 06/02/2015    Varicella 08/18/2009     OBJECTIVE  Vitals:   Vitals:    02/09/22 0848   BP: 122/82   Pulse: (!) 107   Temp: (!) 97 4 °F (36 3 °C)   SpO2: 99%   Weight: 60 9 kg (134 lb 3 2 oz)   Height: 5' 6" (1 676 m)     Wt Readings from Last 3 Encounters:   02/09/22 60 9 kg (134 lb 3 2 oz)   11/20/21 61 7 kg (136 lb)   10/14/21 61 kg (134 lb 6 4 oz)     Body mass index is 21 66 kg/m²  BP Readings from Last 3 Encounters:   02/09/22 122/82   11/20/21 133/64   10/14/21 108/64     Patient's last menstrual period was 01/24/2022 (approximate)  Physical Exam  Vitals and nursing note reviewed  Constitutional:       General: She is not in acute distress  Appearance: She is well-developed  She is not ill-appearing  HENT:      Head: Normocephalic and atraumatic  Right Ear: Tympanic membrane, ear canal and external ear normal       Left Ear: Tympanic membrane, ear canal and external ear normal       Nose: Nose normal       Mouth/Throat:      Pharynx: Uvula midline  No oropharyngeal exudate  Tonsils: No tonsillar exudate     Eyes: Conjunctiva/sclera: Conjunctivae normal    Neck:      Thyroid: No thyromegaly  Cardiovascular:      Rate and Rhythm: Normal rate and regular rhythm  Heart sounds: Normal heart sounds  No murmur heard  Pulmonary:      Effort: Pulmonary effort is normal  No respiratory distress  Breath sounds: Normal breath sounds  No decreased breath sounds, wheezing, rhonchi or rales  Abdominal:      General: Bowel sounds are normal  There is no distension  Palpations: Abdomen is soft  Tenderness: There is no abdominal tenderness  Lymphadenopathy:      Cervical: No cervical adenopathy  Skin:     General: Skin is warm and dry  Capillary Refill: Capillary refill takes less than 2 seconds  Neurological:      Mental Status: She is alert and oriented to person, place, and time  Sensory: No sensory deficit  Motor: No abnormal muscle tone  Deep Tendon Reflexes: Reflexes normal           Lab:  I have personally reviewed all pertinent results  Imaging:  I have personally reviewed all pertinent results

## 2022-02-09 NOTE — PROGRESS NOTES
[address]    NAME: Inessa Hodges  AGE: 23 y o  SEX: female  : 1995   DATE: 2022     Assessment and Plan:   Immunizations and preventive care screenings were discussed with patient today  Appropriate education was printed on patient's after visit summary  Counseling:  Alcohol/drug use: discussed moderation in alcohol intake, the recommendations for healthy alcohol use, and avoidance of illicit drug use  Dental Health: discussed importance of regular tooth brushing, flossing, and dental visits  · Exercise: the importance of regular exercise/physical activity was discussed  Recommend exercise 3-5 times per week for at least 30 minutes  Depression Screening and Follow-up Plan: Patient was screened for depression during today's encounter  They screened negative with a PHQ-2 score of 0  No follow-ups on file  Chief Complaint:     Chief Complaint   Patient presents with   Mari Cano [facility]    Physical Exam      History of Present Illness:     Adult Annual Physical   Patient here for a comprehensive physical exam  The patient reports problems - see other note  Diet and Physical Activity  · Diet/Nutrition: well balanced diet  · Exercise: walking  Depression Screening  PHQ-2/9 Depression Screening    Little interest or pleasure in doing things: 0 - not at all  Feeling down, depressed, or hopeless: 0 - not at all       [address] [facility]  · Sleep: sleeps well  · Hearing: normal - bilateral   · Vision: goes for regular eye exams, most recent eye exam <1 year ago and wears glasses  · Dental: regular dental visits  /GYN Health  · Last menstrual period: Patient's last menstrual period was 2022 (approximate)  Review of Systems:     Review of Systems   Constitutional: Negative for activity change, chills and fever  HENT: Negative for congestion, rhinorrhea and sore throat      Eyes: Negative for visual disturbance  Respiratory: Positive for chest tightness  Negative for cough, shortness of breath and wheezing  Cardiovascular: Negative for chest pain and palpitations  Gastrointestinal: Negative for abdominal pain, blood in stool, constipation, diarrhea, nausea and vomiting  Genitourinary: Negative for dysuria  Musculoskeletal: Negative for arthralgias and myalgias  Skin: Negative for rash  Neurological: Positive for headaches  Negative for dizziness and weakness  All other systems reviewed and are negative  Past Medical History:     Past Medical History:   Diagnosis Date    Migraine     Migraine with aura       Past Surgical History:     Past Surgical History:   Procedure Laterality Date    NO PAST SURGERIES      WISDOM TOOTH EXTRACTION        Social History:     Social History     Socioeconomic History    Marital status: Single     Spouse name: Not on file    Number of children: Not on file    Years of education: college student    Highest education level: Not on file   Occupational History    Not on file   Tobacco Use    Smoking status: Never Smoker    Smokeless tobacco: Never Used   Vaping Use    Vaping Use: Never used   Substance and Sexual Activity    Alcohol use: No    Drug use: No    Sexual activity: Never     Birth control/protection: I U D       Comment: Mirena   Other Topics Concern    Not on file   Social History Narrative    Caffeine use    Lives with family     Social Determinants of Health     Financial Resource Strain: Not on file   Food Insecurity: Not on file   Transportation Needs: Not on file   Physical Activity: Not on file   Stress: Not on file   Social Connections: Not on file   Intimate Partner Violence: Not on file   Housing Stability: Not on file      Family History:     Family History   Problem Relation Age of Onset   [facility] Migraines Mother     No Known Problems Father     Migraines Sister     Colon cancer Paternal Grandfather unsure    Migraines Family       Current Medications:     Current Outpatient Medications   Medication Sig Dispense Refill    levonorgestrel (MIRENA) 20 MCG/24HR IUD 1 each by Intrauterine route once      Magnesium 250 MG TABS Take 1 tablet by mouth daily         No current facility-administered medications for this visit  Allergies: Allergies   Allergen Reactions    Latex Hives      Physical Exam:     There were no vitals taken for this visit  Physical Exam  Vitals and nursing note reviewed  Constitutional:       General: She is not in acute distress  Appearance: She is well-developed  She is not ill-appearing  HENT:      Head: Normocephalic and atraumatic  Right Ear: Tympanic membrane, ear canal and external ear normal  No middle ear effusion  Left Ear: Tympanic membrane, ear canal and external ear normal   No middle ear effusion  Nose: Nose normal  No congestion or rhinorrhea  Mouth/Throat:      Lips: Pink  Mouth: Mucous membranes are moist       Pharynx: Oropharynx is clear  Uvula midline  No oropharyngeal exudate  Tonsils: No tonsillar exudate  Eyes:      General: Lids are normal       Extraocular Movements: Extraocular movements intact  Conjunctiva/sclera: Conjunctivae normal       Pupils: Pupils are equal, round, and reactive to light  Neck:      Thyroid: No thyromegaly  Trachea: No tracheal deviation  Cardiovascular:      Rate and Rhythm: Normal rate and regular rhythm  Pulses: Normal pulses  Heart sounds: Normal heart sounds, S1 normal and S2 normal  No murmur heard  Pulmonary:      Effort: Pulmonary effort is normal  No respiratory distress  Breath sounds: Normal breath sounds  No decreased breath sounds, wheezing, rhonchi or rales  Abdominal:      General: Bowel sounds are normal  There is no distension  Palpations: Abdomen is soft  Tenderness: There is no abdominal tenderness     Musculoskeletal: Right lower leg: No edema  Left lower leg: No edema  Lymphadenopathy:      Cervical: No cervical adenopathy  Skin:     General: Skin is warm and dry  Capillary Refill: Capillary refill takes less than 2 seconds  Neurological:      Mental Status: She is alert and oriented to person, place, and time  Cranial Nerves: Cranial nerves are intact  Deep Tendon Reflexes: Reflexes normal       Reflex Scores:       Patellar reflexes are 2+ on the right side and 2+ on the left side  Psychiatric:         Attention and Perception: Attention normal          Mood and Affect: Mood normal          Thought Content: Thought content does not include suicidal ideation            [id number]

## 2022-02-09 NOTE — PATIENT INSTRUCTIONS
Headache/Migraine Instructions from Dr Simba Garcia:    Headache management instructions  - When patient has a moderate to severe headache, they should seek rest, initiate relaxation and apply cold compresses to the head  - Maintain regular sleep schedule  Adults need at least 7-8 hours of uninterrupted a night  - Limit over the counter medications such as Tylenol, Ibuprofen, Aleve, Excedrin  (No more than 2- 3 times a week or max 10 a month)  - Maintain headache diary  Free NETTIE for a smart phone, which can be used is "Migraine sabine"  - Limit caffeine to 1-2 cups 8 to 16 oz a day or less  - Avoid dietary trigger  (aged cheese, peanuts, MSG, aspartame and nitrates)  - Patient is to have regular frequent meals to prevent headache onset  - Please drink at least 64 ounces of water a day to help remain hydrated  Preventive therapy for headaches:   -Over-the-counter supplements: to decrease intensity and frequency of migraines  - Magnesium Oxide 400mg a day  If any diarrhea or upset stomach, decrease dose as tolerated  - Vitamin B2 200 mg twice a day  May cause the urine to turn yellow which is normal for B2 to do and is not a sign that you are dehydrated  - And/Or: Petasites/Butterbur 150 mg daily - try online; (When choosing your Butterbur online or in the store, beware that there are some poor preps containing pyrrolizidine alkaloids (PAs) that can be harmful to the liver  Therefore, do not use butterbur products that are not labeled as PA-free )     Lifestyle Recommendations:  [x] SLEEP - Maintain a regular sleep schedule: Adults need at least 7-8 hours of uninterrupted a night  Maintain good sleep hygiene:  Going to bed and waking up at consistent times, avoiding excessive daytime naps, avoiding caffeinated beverages in the evening, avoid excessive stimulation in the evening and generally using bed primarily for sleeping    One hour before bedtime would recommend turning lights down lower, decreasing your activity (may read quietly, listen to music at a low volume)  When you get into bed, should eliminate all technology (no texting, emailing, playing with your phone, iPad or tablet in bed)  [x] HYDRATION - Maintain good hydration  Drink  2L of fluid a day (4 typical small water bottles)  [x] DIET - Maintain good nutrition  In particular don't skip meals and try and eat healthy balanced meals regularly  [x] TRIGGERS - Look for other triggers and avoid them: Limit caffeine to 1-2 cups a day or less  Avoid dietary triggers that you have noticed bring on your headaches (this could include aged cheese, peanuts, MSG, aspartame and nitrates)  [x] EXERCISE - physical exercise as we all know is good for you in many ways, and not only is good for your heart, but also is beneficial for your mental health, cognitive health and  chronic pain/headaches  I would encourage at the least 5 days of physical exercise weekly for at least 30 minutes  Neck pain:   - Neck pathology and poor posture, with straightening of the normal cervical lordosis, can cause headaches  Tightening of the neck muscles can irritate the nerves in the occipital region of the head and cause or worsen head pain  Thus neck strengthening and relaxation exercises, can help improve this particular pain  It is importance to have good posture for improving shoulder, neck, and head pain  - Here are some exercises which should take 5 minutes:     1  Standing, drop your head to one side while continuing to look ahead  Hold for 10 seconds then swap sides  Repeat twice more each side  To increase the stretch, drop the opposite shoulder  2  Standing again, lower your chin to your chest, hold for 10 and then look up to the ceiling and hold for 10  Repeat twice more  3  Next, standing straight again, look over your right shoulder and hold firm for 10 seconds, then over your left shoulder for 10  Repeat this 3 times       4  Finally, while sitting upright, bring your head forward and hold for 10, then all the way back and hold for 10  If this simple exercise does not help improve the posture, we will consider formal physical therapy in the future  Medication overuse headaches:   - Medication overuse headache [facility]) and analgesic overuse can negate the effectiveness of headache preventive measures  Avoid medications with narcotics, barbiturates, or caffeine in them as these can cause rebound headaches after very few doses and can interfere with other headache medicine efficacy  Taking  any acute/abortive over the counter medication or prescription drugs for more than 2-3 days a week can cause medication overuse headache  Cognitive behavioral therapy (CBT) is a common type of talk therapy (psychotherapy) were you work with a psychotherapist or therapist   CBT helps you become aware of inaccurate or negative thinking so you can view challenging situations more clearly and respond to them in a more effective way  CBT can be a very helpful tool ? either alone or in combination with other therapies ? in treating mental health disorders (depression, post-traumatic stress disorder (PTSD) or an eating disorder) and chronic pain  CBT can be an effective tool to help anyone learn how to better manage stressful life situations and pain  In some cases, CBT is most effective when it's combined with other treatments, such as antidepressants or other medications  You can start yourself by down loading the kassandra: [employer]      Importance of Healthy Sleep:  Behavioral sleep changes can promote restful, regular sleep and reduce headache  Simple changes like establishing consistent sleep and wake-up times, as well as getting between 7 and 8 hours of sleep a day, can make a world of difference  Experts also recommend avoiding substances that impair sleep, like caffeine, nicotine and alcohol, and also suggest winding down before bed to prevent sleep problems   To read more go to https://[employer]/resource-library/sleep/    Exercising for migraineurs:  Regular exercise can reduce the frequency and intensity of headaches and migraines  When one exercises, the body releases endorphins, which are the bodys natural painkillers  Exercise reduces stress and helps individuals to sleep at night  Exercising at least 30 to 40 minutes 3 times a week is sufficient for most patients  When exercising, follow this plan to prevent headaches:  - First, stay hydrated before, during, and after exercise  - Second part of the exercise plan is to eat sufficient food about an hour and a half before you exercise  Exercise causes ones blood sugar level to decrease, and it is important to have a source of energy    - Final part of the exercise plan is to warm-up  Do not jump into sudden, vigorous exercise if that triggers a headache or migraine  To read more go to https://[employer]/resource-library/effects-of-exercise-on-headaches-and-migraines/    Wellness Visit for Adults   AMBULATORY CARE:   A wellness visit  is when you see your healthcare provider to get screened for health problems  Your healthcare provider will also give you advice on how to stay healthy  Write down your questions so you remember to ask them  Ask your healthcare provider how often you should have a wellness visit  What happens at a wellness visit:  Your healthcare provider will ask about your health, and your family history of health problems  This includes high blood pressure, heart disease, and cancer  He or she will ask if you have symptoms that concern you, if you smoke, and about your mood  You may also be asked about your intake of medicines, supplements, food, and alcohol  Any of the following may be done:  · Your weight  will be checked  Your height may also be checked so your body mass index (BMI) can be calculated  Your BMI shows if you are at a healthy weight      · Your blood pressure  and heart rate will be checked  Your temperature may also be checked  · Blood and urine tests  may be done  Blood tests may be done to check your cholesterol levels  Abnormal cholesterol levels increase your risk for heart disease and stroke  You may also need a blood or urine test to check for diabetes if you are at increased risk  Urine tests may be done to look for signs of an infection or kidney disease  · A physical exam  includes checking your heartbeat and lungs with a stethoscope  Your healthcare provider may also check your skin to look for sun damage  · Screening tests  may be recommended  A screening test is done to check for diseases that may not cause symptoms  The screening tests you may need depend on your age, gender, family history, and lifestyle habits  For example, colorectal screening may be recommended if you are 48years old or older  Screening tests you need if you are a woman:   · A Pap smear  is used to screen for cervical cancer  Pap smears are usually done every 3 to 5 years depending on your age  You may need them more often if you have had abnormal Pap smear test results in the past  Ask your healthcare provider how often you should have a Pap smear  · A mammogram  is an x-ray of your breasts to screen for breast cancer  Experts recommend mammograms every 2 years starting at age 48 years  You may need a mammogram at age 52 years or younger if you have an increased risk for breast cancer  Talk to your healthcare provider about when you should start having mammograms and how often you need them  Vaccines you may need:   · Get an influenza vaccine  every year  The influenza vaccine protects you from the flu  Several types of viruses cause the flu  The viruses change over time, so new vaccines are made each year  · Get a tetanus-diphtheria (Td) booster vaccine  every 10 years  This vaccine protects you against tetanus and diphtheria   Tetanus is a severe infection that may cause painful muscle spasms and lockjaw  Diphtheria is a severe bacterial infection that causes a thick covering in the back of your mouth and throat  · Get a human papillomavirus (HPV) vaccine  if you are female and aged 23 to 32 or male 23 to 24 and never received it  This vaccine protects you from HPV infection  HPV is the most common infection spread by sexual contact  HPV may also cause vaginal, penile, and anal cancers  · Get a pneumococcal vaccine  if you are aged 72 years or older  The pneumococcal vaccine is an injection given to protect you from pneumococcal disease  Pneumococcal disease is an infection caused by pneumococcal bacteria  The infection may cause pneumonia, meningitis, or an ear infection  · Get a shingles vaccine  if you are 60 or older, even if you have had shingles before  The shingles vaccine is an injection to protect you from the varicella-zoster virus  This is the same virus that causes chickenpox  Shingles is a painful rash that develops in people who had chickenpox or have been exposed to the virus  How to eat healthy:  My Plate is a model for planning healthy meals  It shows the types and amounts of foods that should go on your plate  Fruits and vegetables make up about half of your plate, and grains and protein make up the other half  A serving of dairy is included on the side of your plate  The amount of calories and serving sizes you need depends on your age, gender, weight, and height  Examples of healthy foods are listed below:  · Eat a variety of vegetables  such as dark green, red, and orange vegetables  You can also include canned vegetables low in sodium (salt) and frozen vegetables without added butter or sauces  · Eat a variety of fresh fruits , canned fruit in 100% juice, frozen fruit, and dried fruit  · Include whole grains  At least half of the grains you eat should be whole grains   Examples include whole-wheat bread, wheat pasta, brown rice, and whole-grain cereals such as oatmeal     · Eat a variety of protein foods such as seafood (fish and shellfish), lean meat, and poultry without skin (turkey and chicken)  Examples of lean meats include pork leg, shoulder, or tenderloin, and beef round, sirloin, tenderloin, and extra lean ground beef  Other protein foods include eggs and egg substitutes, beans, peas, soy products, nuts, and seeds  · Choose low-fat dairy products such as skim or 1% milk or low-fat yogurt, cheese, and cottage cheese  · Limit unhealthy fats  such as butter, hard margarine, and shortening  Exercise:  Exercise at least 30 minutes per day on most days of the week  Some examples of exercise include walking, biking, dancing, and swimming  You can also fit in more physical activity by taking the stairs instead of the elevator or parking farther away from stores  Include muscle strengthening activities 2 days each week  Regular exercise provides many health benefits  It helps you manage your weight, and decreases your risk for type 2 diabetes, heart disease, stroke, and high blood pressure  Exercise can also help improve your mood  Ask your healthcare provider about the best exercise plan for you  General health and safety guidelines:   · Do not smoke  Nicotine and other chemicals in cigarettes and cigars can cause lung damage  Ask your healthcare provider for information if you currently smoke and need help to quit  E-cigarettes or smokeless tobacco still contain nicotine  Talk to your healthcare provider before you use these products  · Limit alcohol  A drink of alcohol is 12 ounces of beer, 5 ounces of wine, or 1½ ounces of liquor  · Lose weight, if needed  Being overweight increases your risk of certain health conditions  These include heart disease, high blood pressure, type 2 diabetes, and certain types of cancer  · Protect your skin  Do not sunbathe or use tanning beds   Use sunscreen with a SPF 13 or higher  Apply sunscreen at least 15 minutes before you go outside  Reapply sunscreen every 2 hours  Wear protective clothing, hats, and sunglasses when you are outside  · Drive safely  Always wear your seatbelt  Make sure everyone in your car wears a seatbelt  A seatbelt can save your life if you are in an accident  Do not use your cell phone when you are driving  This could distract you and cause an accident  Pull over if you need to make a call or send a text message  · Practice safe sex  Use latex condoms if are sexually active and have more than one partner  Your healthcare provider may recommend screening tests for sexually transmitted infections (STIs)  · Wear helmets, lifejackets, and protective gear  Always wear a helmet when you ride a bike or motorcycle, go skiing, or play sports that could cause a head injury  Wear protective equipment when you play sports  Wear a lifejacket when you are on a boat or doing water sports  © Copyright [employer] 2021 Information is for End User's use only and may not be sold, redistributed or otherwise used for commercial purposes  All illustrations and images included in CareNotes® are the copyrighted property of A D A M , Inc  or [facility] Kojo Falk   The above information is an  only  It is not intended as medical advice for individual conditions or treatments  Talk to your doctor, nurse or pharmacist before following any medical regimen to see if it is safe and effective for you

## 2022-02-16 ENCOUNTER — PATIENT MESSAGE (OUTPATIENT)
Dept: FAMILY MEDICINE CLINIC | Facility: CLINIC | Age: 27
End: 2022-02-16

## 2022-02-16 DIAGNOSIS — G43.109 MIGRAINE WITH AURA AND WITHOUT STATUS MIGRAINOSUS, NOT INTRACTABLE: Primary | ICD-10-CM

## 2022-02-23 ENCOUNTER — TELEPHONE (OUTPATIENT)
Dept: NEUROLOGY | Facility: CLINIC | Age: 27
End: 2022-02-23

## 2022-02-23 NOTE — TELEPHONE ENCOUNTER
Patient calling to reestablish new patient appointment with us  Former patient of [place]  No recent testing  Triage intake sent

## 2022-03-02 ENCOUNTER — TELEPHONE (OUTPATIENT)
Dept: NEUROLOGY | Facility: CLINIC | Age: 27
End: 2022-03-02

## 2022-03-02 NOTE — TELEPHONE ENCOUNTER
Patient called to schedule triage questions were completed  New patient appt   7/20 @ 2:00 in [place] with dr Miguel Wellington

## 2022-04-08 ENCOUNTER — TELEPHONE (OUTPATIENT)
Dept: NEUROLOGY | Facility: CLINIC | Age: 27
End: 2022-04-08

## 2022-04-11 ENCOUNTER — CONSULT (OUTPATIENT)
Dept: NEUROLOGY | Facility: CLINIC | Age: 27
End: 2022-04-11
Payer: COMMERCIAL

## 2022-04-11 VITALS
BODY MASS INDEX: 21.69 KG/M2 | DIASTOLIC BLOOD PRESSURE: 60 MMHG | SYSTOLIC BLOOD PRESSURE: 110 MMHG | HEART RATE: 70 BPM | WEIGHT: 135 LBS | HEIGHT: 66 IN

## 2022-04-11 DIAGNOSIS — G43.409 HEMIPLEGIC MIGRAINE WITHOUT STATUS MIGRAINOSUS, NOT INTRACTABLE: Primary | ICD-10-CM

## 2022-04-11 DIAGNOSIS — G43.109 MIGRAINE WITH AURA AND WITHOUT STATUS MIGRAINOSUS, NOT INTRACTABLE: ICD-10-CM

## 2022-04-11 PROCEDURE — 1036F TOBACCO NON-USER: CPT | Performed by: STUDENT IN AN ORGANIZED HEALTH CARE EDUCATION/TRAINING PROGRAM

## 2022-04-11 PROCEDURE — 3008F BODY MASS INDEX DOCD: CPT | Performed by: STUDENT IN AN ORGANIZED HEALTH CARE EDUCATION/TRAINING PROGRAM

## 2022-04-11 PROCEDURE — 99205 OFFICE O/P NEW HI 60 MIN: CPT | Performed by: STUDENT IN AN ORGANIZED HEALTH CARE EDUCATION/TRAINING PROGRAM

## 2022-04-11 RX ORDER — VERAPAMIL HYDROCHLORIDE 120 MG/1
120 CAPSULE, EXTENDED RELEASE ORAL
Qty: 90 CAPSULE | Refills: 3 | Status: SHIPPED | OUTPATIENT
Start: 2022-04-11

## 2022-04-11 NOTE — PATIENT INSTRUCTIONS
It was a pleasure meeting you in clinic today  We will be starting you on a medication called verapamil  You will start by taking 120 mg once a day (one pill)  After 2 weeks, increase to 240 mg (2 pills)  Please take this medication every day for migraine prevention  We will also start you on a medication called lasmitidan or Reyvow - you can take one dose when your symptoms start  If after starting verapamil you start to have any dizziness or low blood pressure, please reach out to the office and stop taking the medication  We will plan to touch base over telephone in about 1 month  Please make a follow up appointment for 3 months  Migraine Headache   AMBULATORY CARE:   A migraine headache  is a severe headache  The pain can be so severe that it interferes with your daily activities  A migraine can last a few hours up to several days  The exact cause of migraines is not known  A family history of migraines increases your risk  Your risk is also higher if you are a woman or take medicines such as estrogen or a vasodilator  Common warning signs include the following:  Warning signs usually start 15 to 60 minutes before the headache:  · Visual changes (auras), such as blurred vision, temporary blind or bright spots, lines, or hallucinations    · Unusual tiredness or frequent yawning    · Tingling in an arm or leg    Signs and symptoms of a migraine headache:  A migraine headache usually begins as a dull ache around the eye or temple  The pain may get worse with movement   You may also have the following:  · Pain in your head that may increase to the point that you cannot do everyday activities    · Pain on one or both sides of your head    · Throbbing, pulsing, or pounding pain in your head    · Nausea and vomiting    · Sensitivity to light, noise, or smells    Call your local emergency number (911 in the [address] [place] [address],[address]) or have someone call if:   · You feel like you are going to faint, you become confused, or you have a seizure  Seek care immediately if:   · You have a headache that seems different or much worse than your usual migraine headache  · You have a severe headache with a fever or a stiff neck  · You have new problems with speech, vision, balance, or movement  Call your doctor or neurologist if:   · You have a fever  · Your migraines interfere with your daily activities  · Your medicines or treatments stop working  · You have questions about your condition or care  Treatment:  Migraines cannot be cured  The goal of treatment is to reduce your symptoms  Take medicine as soon as you feel a migraine begin, or as directed  The following may be used to manage migraines:  · Medicines  may be given to prevent or treat migraines  Take medicine to prevent migraines as soon as you feel a migraine begin, or as directed  Your healthcare provider may recommend any of the following:    ? Migraine medicines  are used to help prevent or stop a migraine  ? NSAIDs  help decrease swelling and pain or fever  This medicine is available with or without a doctor's order  NSAIDs can cause stomach bleeding or kidney problems in certain people  If you take blood thinner medicine, always ask your healthcare provider if NSAIDs are safe for you  Always read the medicine label and follow directions  ? Acetaminophen  decreases pain and fever  It is available without a doctor's order  Ask how much to take and how often to take it  Follow directions  Read the labels of all other medicines you are using to see if they also contain acetaminophen, or ask your doctor or pharmacist  Acetaminophen can cause liver damage if not taken correctly  Do not use more than 4 grams (4,000 milligrams) total of acetaminophen in one day  ? Prescription pain medicine  may be given  Ask your healthcare provider how to take this medicine safely  Some prescription pain medicines contain acetaminophen   Do not take other medicines that contain acetaminophen without talking to your healthcare provider  Too much acetaminophen may cause liver damage  Prescription pain medicine may cause constipation  Ask your healthcare provider how to prevent or treat constipation  ? Antinausea medicine  may be given to calm your stomach and to help prevent vomiting  This medicine can also help relieve pain  · Cognitive behavior therapy (CBT)  can help you learn ways to manage and prevent migraines  A therapist can teach you to relax and to reduce stress  You may learn ways to create healthy nutrition, activity, and sleep habits to prevent migraines  The therapist can also help you manage conditions that can affect migraines, such as anxiety or depression  Common triggers for a migraine include the following:   · Stress, eye strain, oversleeping, or not getting enough sleep    · Hormone changes in women from birth control pills, pregnancy, menopause, or during a monthly period    · Skipping meals, going too long without eating, or not drinking enough liquids    · Certain foods or drinks such as chocolate, hard cheese, alcohol, or drinks that contain caffeine    · Foods that contain gluten, nitrates, MSG, or artificial sweeteners    · Sunlight, bright or flashing lights, loud noises, smoke, or strong smells    · Heat, humidity, or changes in the weather    Manage your symptoms:   · Rest in a dark, quiet room  This will help decrease your pain  Sleep may also help relieve the pain  · Apply ice to decrease pain  Use an ice pack, or put crushed ice in a plastic bag  Cover the ice pack with a towel and place it on your head where it hurts for 15 to 20 minutes every hour  · Apply heat to decrease pain and muscle spasms  Use a small towel dampened with warm water or a heating pad, or sit in a warm bath  Apply heat on the area for 20 to 30 minutes every 2 hours  You may alternate heat and ice  · Keep a migraine record    Write down when your migraines start and stop  Include your symptoms and what you were doing when a migraine began  Record what you ate or drank for 24 hours before the migraine started  Keep track of what you did to treat your migraine and if it worked  Prevent another migraine headache:   · Prevent a medicine overuse headache  Take pain medicines only as long as directed  A medicine may be limited to a certain amount each month  Your healthcare provider can help you create a plan so you get a safe amount each month  · Do not smoke  Nicotine and other chemicals in cigarettes and cigars can trigger a migraine and also cause lung damage  Ask your healthcare provider for information if you currently smoke and need help to quit  E-cigarettes or smokeless tobacco still contain nicotine  Talk to your healthcare provider before you use these products  · Do not drink alcohol  Alcohol can trigger a migraine  It can also interfere with the medicines used to treat your migraine  · Be physically active  Physical activity, such as exercise, may help prevent migraines  Talk to your healthcare provider about the best activity plan for you  Try to get at least 30 minutes of physical activity on most days  · Manage stress  Stress may trigger a migraine  Learn new ways to relax, such as deep breathing  · Follow a sleep schedule  Go to bed and get up at the same time each day  · Eat a variety of healthy foods  Healthy foods include fruit, vegetables, whole-grain breads, low-fat dairy products, beans, lean meats, and fish  Do not have foods or drinks that trigger your migraines  · Drink more liquids to prevent dehydration  Your healthcare provider can tell you how much liquid to drink each day and which liquids are best for you  Follow up with your doctor or neurologist as directed:  Bring your migraine record with you  Write down your questions so you remember to ask them during your visits    © Copyright [employer] 2022 Information is for End User's use only and may not be sold, redistributed or otherwise used for commercial purposes  All illustrations and images included in CareNotes® are the copyrighted property of A D A M , Inc  or Mayi Miramontes  The above information is an  only  It is not intended as medical advice for individual conditions or treatments  Talk to your doctor, nurse or pharmacist before following any medical regimen to see if it is safe and effective for you

## 2022-04-11 NOTE — PROGRESS NOTES
Patient ID: Sade Rodriguez is a 23 y o  female  Assessment/Plan:  #sporadic hemiplegic migraine  -start verapamil 120 mg ER daily for migraine prevention  Plan to uptitrate to 240 mg ER daily as tolerated  -start lasmiditan 50 mg as needed for migraines as abortive therapy  -discontinue sumatriptan given relative contraindication in the setting of hemiplegic migraines  -f/u in 1 month via telephone  -3 month f/u in clinic    Subjective:    HPI   22 yo F with PMHx allergic rhinitis who presents for evaluation of migraines  She states that she started having migraines in high school, when she was about [age]  Her typical events start with her hands becoming weak and a spot in her vision for 10 min  This then progresses to numbness in her hands and face typically on the L side  At times, these symptoms also spread to her feet  She will sometimes have associated aphasia and slowing of cognitive function  Headaches start approximately 30 min after the onset of these symptoms and are localized primarily behind her eye  Can be bilateral with a L sided preference, 4/10 in intensity  Endorses associated photophobia and nausea  Symptoms can be exacerbated by exercise  She typically takes 1 advil and goes to sleep when these symptoms start and they resolve after a few hours  Has 2-3 migraines a month and will need to call out of work when they occur during the day  Identifies caffeine and exercise as triggers, but migraines also somewhat relieved by caffeine after onset  She keeps     Mother and grandmother also have migraines with visual aura, but do not have the associated numbness/weakness/aphasia  PMHx: allergic rhinitis and hemiplegic migraines  FHx: migraines in mother and grandmother  SocHx: nonsmoker  Works as a  with [employer]  Objective:    Blood pressure 110/60, pulse 70, height 5' 6" (1 676 m), weight 61 2 kg (135 lb)      Physical Exam  Constitutional:       Appearance: Normal appearance  HENT:      Head: Normocephalic and atraumatic  Eyes:      Extraocular Movements: EOM normal  No nystagmus  Cardiovascular:      Rate and Rhythm: Normal rate  Musculoskeletal:         General: Normal range of motion  Skin:     General: Skin is warm and dry  Neurological:      Mental Status: She is alert  Coordination: Coordination is intact  Deep Tendon Reflexes: Strength normal       Reflex Scores:       Tricep reflexes are 2+ on the right side and 2+ on the left side  Bicep reflexes are 2+ on the right side and 2+ on the left side  Brachioradialis reflexes are 2+ on the right side and 2+ on the left side  Patellar reflexes are 3+ on the right side and 3+ on the left side  Psychiatric:         Mood and Affect: Mood normal          Speech: Speech normal          Neurological Exam  Mental Status  Alert  Oriented to person, place, time and situation  Recent and remote memory are intact  Speech is normal  Language is fluent with no aphasia  Attention and concentration are normal  Fund of knowledge is appropriate for level of education  Cranial Nerves  CN II: Right visual acuity: normal  Left visual acuity: normal  Visual fields full to confrontation  CN III, IV, VI: Extraocular movements intact bilaterally  No nystagmus  Normal saccades  Normal smooth pursuit  Right pupil: 3 mm  Round  Reactive to light  Left pupil: 3 mm  Round  Reactive to light  CN V: Facial sensation is normal   CN VII: Full and symmetric facial movement  CN VIII: Hearing is normal   CN IX, X: Palate elevates symmetrically  CN XI: Shoulder shrug strength is normal   CN XII: Tongue midline without atrophy or fasciculations  Motor  Normal muscle bulk throughout  No fasciculations present  Normal muscle tone  No abnormal involuntary movements  Strength is 5/5 throughout all four extremities      Sensory  Sensation is intact to light touch, pinprick, vibration and proprioception in all four extremities  Reflexes                                           Right                      Left  Brachioradialis                    2+                         2+  Biceps                                 2+                         2+  Triceps                                2+                         2+  Patellar                                3+                         3+    Coordination  Finger-to-nose, rapid alternating movements and heel-to-shin normal bilaterally without dysmetria  Gait  Normal casual, toe, heel and tandem gait  ROS:    Review of Systems   Constitutional: Negative  Negative for appetite change and fever  HENT: Negative  Negative for hearing loss, tinnitus, trouble swallowing and voice change  Eyes: Negative  Negative for photophobia and pain  Respiratory: Negative  Negative for shortness of breath  Cardiovascular: Negative  Negative for palpitations  Gastrointestinal: Negative  Negative for nausea and vomiting  Endocrine: Negative  Negative for cold intolerance  Genitourinary: Negative  Negative for dysuria, frequency and urgency  Musculoskeletal: Negative  Negative for myalgias and neck pain  Skin: Negative  Negative for rash  Neurological: Positive for headaches  Negative for dizziness, tremors, seizures, syncope, facial asymmetry, speech difficulty, weakness, light-headedness and numbness  Patient stated that she has 2 headaches a month  Hematological: Negative  Does not bruise/bleed easily  Psychiatric/Behavioral: Positive for sleep disturbance  Negative for confusion and hallucinations  I have spent 60 minutes with the patient today in which greater than 50% of this time was spent in counseling/coordination of care regarding Risks and benefits of tx options, Intructions for management, Patient and family education and Risk factor reductions

## 2022-04-15 ENCOUNTER — TELEPHONE (OUTPATIENT)
Dept: NEUROLOGY | Facility: CLINIC | Age: 27
End: 2022-04-15

## 2022-04-15 NOTE — TELEPHONE ENCOUNTER
THE [facility] for patient to call office [phone] to schedule her 3 month follow up appointment with Dr Wendy Muniz for July

## 2022-05-26 ENCOUNTER — OFFICE VISIT (OUTPATIENT)
Dept: FAMILY MEDICINE CLINIC | Facility: CLINIC | Age: 27
End: 2022-05-26
Payer: COMMERCIAL

## 2022-05-26 ENCOUNTER — TELEPHONE (OUTPATIENT)
Dept: FAMILY MEDICINE CLINIC | Facility: CLINIC | Age: 27
End: 2022-05-26

## 2022-05-26 DIAGNOSIS — Z11.52 ENCOUNTER FOR SCREENING FOR COVID-19: Primary | ICD-10-CM

## 2022-05-26 DIAGNOSIS — J06.9 UPPER RESPIRATORY TRACT INFECTION, UNSPECIFIED TYPE: Primary | ICD-10-CM

## 2022-05-26 LAB
SARS-COV-2 AG UPPER RESP QL IA: POSITIVE
VALID CONTROL: ABNORMAL

## 2022-05-26 PROCEDURE — 87811 SARS-COV-2 COVID19 W/OPTIC: CPT | Performed by: FAMILY MEDICINE

## 2022-05-26 PROCEDURE — 99213 OFFICE O/P EST LOW 20 MIN: CPT | Performed by: FAMILY MEDICINE

## 2022-05-26 RX ORDER — AZITHROMYCIN 250 MG/1
TABLET, FILM COATED ORAL
Qty: 6 TABLET | Refills: 0 | Status: SHIPPED | OUTPATIENT
Start: 2022-05-26 | End: 2022-05-30

## 2022-05-26 RX ORDER — BROMPHENIRAMINE MALEATE, PSEUDOEPHEDRINE HYDROCHLORIDE, AND DEXTROMETHORPHAN HYDROBROMIDE 2; 30; 10 MG/5ML; MG/5ML; MG/5ML
5 SYRUP ORAL 4 TIMES DAILY PRN
Qty: 180 ML | Refills: 0 | Status: SHIPPED | OUTPATIENT
Start: 2022-05-26

## 2022-05-26 NOTE — TELEPHONE ENCOUNTER
Started Tues night with sore throat HA , dry cough and congestion,     She took a covid test yesterday and it was negative     She woke up today with low grade fever 99 8  She is fully vaccinated  Should she test again today before you see her?    Please advise What anti-inflammatory has she tried up to this point?

## 2022-06-01 NOTE — PROGRESS NOTES
Patient ID: Jaz Graham is a 23 y o  female  HPI: 23 y  o female presenting with  3 day history of sore throat, nasal congestion, ear pain,pnd and cough  Pt denies any fever, chills, or body aches  SUBJECTIVE    Family History   Problem Relation Age of Onset   Osker Ok Migraines Mother     No Known Problems Father     Migraines Sister     Diabetes Maternal Grandfather     Arthritis Paternal Grandmother         hands, knees    Colon cancer Paternal Grandfather 76    Cancer Paternal Grandfather     Migraines Family     Mental illness Sister         Anxiety    Anxiety disorder Sister     Mental illness Sister         Anxiety    Anxiety disorder Sister     Breast cancer Neg Hx      Social History     Socioeconomic History    Marital status: Single     Spouse name: Not on file    Number of children: Not on file    Years of education: college student    Highest education level: Not on file   Occupational History    Not on file   Tobacco Use    Smoking status: Never Smoker    Smokeless tobacco: Never Used   Vaping Use    Vaping Use: Never used   Substance and Sexual Activity    Alcohol use: Not Currently     Alcohol/week: 0 0 standard drinks     Comment: Only drink at social events    Drug use: Never    Sexual activity: Never     Birth control/protection: I U D       Comment: Mirena   Other Topics Concern    Not on file   Social History Narrative    Caffeine use    Lives with family     Social Determinants of Health     Financial Resource Strain: Not on file   Food Insecurity: Not on file   Transportation Needs: Not on file   Physical Activity: Not on file   Stress: Not on file   Social Connections: Not on file   Intimate Partner Violence: Not on file   Housing Stability: Not on file     Past Medical History:   Diagnosis Date    Allergic 2014    Seasonal    Eating disorder [id number]    past, not current    Headache(784 0) 2011    hemiplegic migraines    Migraine     Migraine with aura     Visual impairment 2003    glasses     Past Surgical History:   Procedure Laterality Date    CYST REMOVAL      NO PAST SURGERIES      WISDOM TOOTH EXTRACTION       Allergies   Allergen Reactions    Latex Hives       Current Outpatient Medications:     brompheniramine-pseudoephedrine-DM 30-2-10 MG/5ML syrup, Take 5 mL by mouth 4 (four) times a day as needed for congestion or cough, Disp: 180 mL, Rfl: 0    famotidine (PEPCID) 40 MG tablet, Take 1 tablet (40 mg total) by mouth daily (Patient taking differently: Take 40 mg by mouth 2 (two) times a day as needed  ), Disp: 30 tablet, Rfl: 1    fluticasone (FLONASE) 50 mcg/act nasal spray, 1 spray into each nostril 2 (two) times a day, Disp: 10 mL, Rfl: 3    ipratropium (ATROVENT) 0 03 % nasal spray, 2 sprays into each nostril every 12 (twelve) hours, Disp: 10 mL, Rfl: 1    Lasmiditan Succinate (REYVOW) 50 MG tablet, Take 1 tablet (50 mg) one time as needed for migraine  Do not use more than one dose per day, or more than four doses per month, Disp: 8 tablet, Rfl: 3    levonorgestrel (MIRENA) 20 MCG/24HR IUD, 1 each by Intrauterine route once, Disp: , Rfl:     Magnesium 250 MG TABS, Take 1 tablet by mouth daily  , Disp: , Rfl:     Riboflavin (VITAMIN B2 PO), Take by mouth, Disp: , Rfl:     verapamil (VERELAN PM) 120 MG 24 hr capsule, Take 1 capsule (120 mg total) by mouth daily at bedtime After 2 weeks, increase to 2 capsules by mouth daily at bedtime  , Disp: 90 capsule, Rfl: 3    Review of Systems  Constitutional:     Denies fever, chills ,fatigue ,weakness ,weight loss, weight gain     ENT: Denies loss of hearing ,nosebleed, nasal discharge ,hoarseness; but admits to nasal congestion , sore throat and ear pain      Pulmonary: Denies shortness of breath ,dyspnea on exertion, orthopnea ; but admits to cough and pnd  Cardiovascular:  Denies bradycardia , tachycardia  ,palpations, lower extremity edema leg, claudication  Breast:  Denies new or changing breast lumps ,nipple discharge ,nipple changes  Abdomen:  Denies abdominal pain , anorexia , indigestion, nausea, vomiting, constipation, diarrhea  Musculoskeletal: Denies myalgias, arthralgias, joint swelling, joint stiffness , limb pain, limb swelling  Lymph: + swollen glands  Gu: Denies polyuria or dysuria  Skin: Denies skin rash, skin lesion, skin wound, itching, dry skin  Neuro: Denies headache, numbness, tingling, confusion, loss of consciousness, dizziness, vertigo  Psychiatric: Denies feelings of depression, suicidal ideation, anxiety, sleep disturbances    OBJECTIVE  There were no vitals taken for this visit  Constitutional:   NAD, well appearing and well nourished     ENT:   Conjunctiva and lids: no injection, edema, or discharge    Pupils and iris: ZURDO bilaterally  External inspection of ears and nose: normal without deformities or discharge  Otoscopic exam: Canals patent without erythema, tm dull and erythematous, effusions bilaterally   Nasal mucosa, septum and turbinates: + turbinate injection, nasal discharge         Oropharynx:  Moist mucosa, normal tongue and tonsils without lesions  + erythema and injection of posterior pharynx with pnd    Pulmonary:Respiratory effort normal rate and rhythm, no increased work of breathing   Auscultation of lungs:  Clear bilaterally with no adventitious breath sounds     Cardiovascular: regular rate and rhythm, S1 and S2, no murmur, no edema and/or varicosities of LE     Abdomen: Soft and nontender with + bowel sounds  No heptomegaly or splenomegaly     Gu: no suprapubic tenderness or CVA tenderness  Lymphatic: + anterior  cervical lymphadenopathy      Musculoskeletal:  Gait and station: Normal gait      Digits and nails normal without clubbing or cyanosis      Inspection/palpation of joints, bones, and muscles:  No joint tenderness, swelling, full active and passive range of motion       Skin: Normal skin turgor and no rashes      Neuro:    Normal reflexes  Pych: alert and oriented to person, place and time     normal mood and affect      Assessment/Plan:Diagnoses and all orders for this visit:    Upper respiratory tract infection, unspecified type  -     azithromycin (ZITHROMAX) 250 mg tablet; Take 2 tablets today then 1 tablet daily x 4 days  -     brompheniramine-pseudoephedrine-DM 30-2-10 MG/5ML syrup; Take 5 mL by mouth 4 (four) times a day as needed for congestion or cough        Reviewed with patient plan to treat with above plan      Patient instructed to call in 72 hours if not feeling better or if symptoms worsen

## 2022-07-18 ENCOUNTER — OFFICE VISIT (OUTPATIENT)
Dept: NEUROLOGY | Facility: CLINIC | Age: 27
End: 2022-07-18
Payer: COMMERCIAL

## 2022-07-18 VITALS
SYSTOLIC BLOOD PRESSURE: 110 MMHG | HEIGHT: 66 IN | WEIGHT: 126 LBS | HEART RATE: 80 BPM | TEMPERATURE: 97.3 F | DIASTOLIC BLOOD PRESSURE: 72 MMHG | BODY MASS INDEX: 20.25 KG/M2

## 2022-07-18 DIAGNOSIS — G43.109 MIGRAINE WITH AURA AND WITHOUT STATUS MIGRAINOSUS, NOT INTRACTABLE: Primary | ICD-10-CM

## 2022-07-18 DIAGNOSIS — E55.9 VITAMIN D DEFICIENCY: ICD-10-CM

## 2022-07-18 PROCEDURE — 99213 OFFICE O/P EST LOW 20 MIN: CPT | Performed by: STUDENT IN AN ORGANIZED HEALTH CARE EDUCATION/TRAINING PROGRAM

## 2022-07-18 NOTE — PROGRESS NOTES
Patient ID: Brandyn Cormier is a 23 y o  female  Assessment/Plan:    Migraine with aura and without status migrainosus, not intractable  Gary Crane is a 23year old female with history of sporadic hemiplegic migraines who presents for follow up  Migraines are preceded by weakness, visual disturbances, numbness 30 minutes prior to developing L retro orbital pain  Sx associated with photophobia, phonophobia and nausea  Patient started on Verapamil 120 mg with plans to increase to 240 mg  Patient did not tolerate the increased dose but fortunately has had decreased migraine frequency with minimal side effects at lower dose  Reports only 1 migraine with associated trigger  Did not use Reyvow due to concern for side effects while driving  Plan:  · Continue Verapamil 120 mg   · Discussed common side effects of abortive medications  · Advised to try Reyvow and assess for side effects   · Advised to take at a moment she does not need to drive   · Continue riboflavin and magnesium   · Okay to take a multivitamin, likely will not interfere with migraine medications   · Will check Vitamin D levels  · Follow up in 6 months, advised to call our office sooner if necessary        Diagnoses and all orders for this visit:    Migraine with aura and without status migrainosus, not intractable  -     Vitamin D 25 hydroxy; Future    Vitamin D deficiency  -     Vitamin D 25 hydroxy; Future         Subjective:    HPI     Gary Crane is a 23year old female with history of sporadic hemiplegic migraines who presents for follow up  Since last appt has only had one short lasting migraine which has triggered by LED lighting in the basement  This resolved once she came out into the sunlight  Reports her typical headache features  She has been taking the verapamil 120 mg as preventative regimen  She tried to go up to 240 mg but did not tolerate as she became lightheaded and foggy   She filled her prescription for revvow but did not take it after reading safety instructions warning against driving for 8 H after taking the dose  Today she asks about taking a daily multivitamin due to family history of osteoporosis  Med history:    Preventative:  Verapamil: reduced migraine frequency at 180 mg  Lightheaded and foggy with 240 mg   Magnesium   B2    Abortive: Advil   Imitrex 50 mg: prescribed by PCP, never used it, advised against it because of hemiplegic migraines    The following portions of the patient's history were reviewed and updated as appropriate: allergies, current medications, past family history, past medical history, past social history, past surgical history and problem list and review of systems  Objective:    Blood pressure 110/72, pulse 80, temperature (!) 97 3 °F (36 3 °C), height 5' 6" (1 676 m), weight 57 2 kg (126 lb)  Physical Exam  Eyes:      General: Lids are normal       Extraocular Movements: Extraocular movements intact  Pupils: Pupils are equal, round, and reactive to light  Neurological:      Deep Tendon Reflexes: Strength normal       Reflex Scores:       Bicep reflexes are 2+ on the right side and 2+ on the left side  Brachioradialis reflexes are 2+ on the right side and 2+ on the left side  Patellar reflexes are 2+ on the right side and 2+ on the left side  Neurological Exam  Mental Status  Awake, alert and oriented to person, place and time  Cranial Nerves  CN II: Visual fields full to confrontation  CN III, IV, VI: Extraocular movements intact bilaterally  Normal lids and orbits bilaterally  Pupils equal round and reactive to light bilaterally  CN V: Facial sensation is normal   CN VII: Full and symmetric facial movement  CN VIII: Hearing is normal   CN IX, X: Palate elevates symmetrically  CN XI: Shoulder shrug strength is normal   CN XII: Tongue midline without atrophy or fasciculations  Motor   Strength is 5/5 throughout all four extremities      Sensory  Light touch is normal in upper and lower extremities  Reflexes                                            Right                      Left  Brachioradialis                    2+                         2+  Biceps                                 2+                         2+  Patellar                                2+                         2+    Gait  Casual gait is normal including stance, stride, and arm swing  ROS:    Review of Systems   Constitutional: Negative  Negative for appetite change and fever  HENT: Negative  Negative for hearing loss, tinnitus, trouble swallowing and voice change  Eyes: Negative  Negative for photophobia and pain  Respiratory: Negative  Negative for shortness of breath  Cardiovascular: Negative  Negative for palpitations  Gastrointestinal: Negative  Negative for nausea and vomiting  Endocrine: Negative  Negative for cold intolerance  Genitourinary: Negative  Negative for dysuria, frequency and urgency  Musculoskeletal: Negative  Negative for myalgias and neck pain  Skin: Negative  Negative for rash  Neurological: Positive for light-headedness (Sometimes) and headaches  Negative for dizziness, tremors, seizures, syncope, facial asymmetry, speech difficulty, weakness and numbness  Hematological: Negative  Does not bruise/bleed easily  Psychiatric/Behavioral: Negative  Negative for confusion, hallucinations and sleep disturbance

## 2022-07-18 NOTE — ASSESSMENT & PLAN NOTE
Gary Crane is a 23year old female with history of sporadic hemiplegic migraines who presents for follow up  Migraines are preceded by weakness, visual disturbances, numbness 30 minutes prior to developing L retro orbital pain  Sx associated with photophobia, phonophobia and nausea  Patient started on Verapamil 120 mg with plans to increase to 240 mg  Patient did not tolerate the increased dose but fortunately has had decreased migraine frequency with minimal side effects at lower dose  Reports only 1 migraine with associated trigger  Did not use Reyvow due to concern for side effects while driving       Plan:  · Continue Verapamil 120 mg   · Discussed common side effects of abortive medications  · Advised to try Reyvow and assess for side effects   · Advised to take at a moment she does not need to drive   · Continue riboflavin and magnesium   · Okay to take a multivitamin, likely will not interfere with migraine medications   · Will check Vitamin D levels  · Follow up in 6 months, advised to call our office sooner if necessary

## 2022-07-20 ENCOUNTER — TELEPHONE (OUTPATIENT)
Dept: NEUROLOGY | Facility: CLINIC | Age: 27
End: 2022-07-20

## 2022-07-20 NOTE — TELEPHONE ENCOUNTER
THE [facility] to schedule patient's six month follow up visit with Dr Scott at [facility] in January as her schedule is now available  Call back number given [phone]

## 2022-08-09 NOTE — TELEPHONE ENCOUNTER
THE [facility] to schedule patient's six month follow up appointment with Dr Isael Anderson at the [facility] as her January schedule is now available  Call back number given [phone]

## 2022-10-19 NOTE — PROGRESS NOTES
Assessment   33 y o   presenting for annual exam      Plan   Diagnoses and all orders for this visit:    Encntr for gyn exam (general) (routine) w/o abn findings        Pap up to date  Menorrhagia- had Mirena IUD placed for management of this and had great success with Mirena in place  Over the past year her menses have started to become heavy  Discussed that in year 5 of Mirena placement this can happen  Discussed returning to office for Mirena removal and reinsertion  Pt is agreeable to this  SBE encouraged, A yearly mammogram is recommended for breast cancer screening starting at age 36  ASCCP guidelines reviewed  Advised to call with any issues, all concerns & questions addressed  See provided information in your after visit summary     F/U Annually and PRN    Results will be released to [facility], if abnormal will call or message to review and discuss treatment plan      __________________________________________________________________    Subjective     Gianni Coates is a 33 y o   presenting for annual exam      Mirena IUD placed 2017  Over the past year has started to experience heavier menses again  Menses are not nearly as heavy as pre-Mirena but they have gotten progressively heavier in her 5th year with Mirena  She would be interested in a removal and re-insertion to regulate her menses     SCREENING  Last Pap: 10/2021 negative      GYN  Periods are regular every 28-30 days, lasting 7 days  Dysmenorrhea:moderate, occurring    Sexually active: Never  Contraception: Mirena IUD    Hx Abnormal pap: denies  We reviewed ASCCP guidelines for Pap testing today  Gardasil: She completed the Gardasil series  Denies vaginal discharge, itching, odor, dyspareunia, pelvic pain and vulvar/vaginal symptoms      OB           Complaints: denies   Denies urgency, frequency, hematuria, leakage / change in stream, difficulty urinating         BREAST  Complaints: denies   Denies: breast lump, breast tenderness, nipple discharge, skin color change, and skin lesion(s)  Personal hx: none      Pertinent Family Hx:   Family hx of breast cancer: no  Family hx of ovarian cancer: no  Family hx of colon cancer: PGF      GENERAL  PMH reviewed/updated and is as below  Patient does follow with a PCP  SOCIAL  Smoking: no  Alcohol:social  Drug: no  Occupation: medical device engineer       Past Medical History:   Diagnosis Date   • Allergic 2014    Seasonal   • Eating disorder [id number]    past, not current   • Headache(784 0) 2011    hemiplegic migraines   • Migraine    • Migraine with aura    • Visual impairment 2003    glasses       Past Surgical History:   Procedure Laterality Date   • CYST REMOVAL     • NO PAST SURGERIES     • WISDOM TOOTH EXTRACTION           Current Outpatient Medications:   •  brompheniramine-pseudoephedrine-DM 30-2-10 MG/5ML syrup, Take 5 mL by mouth 4 (four) times a day as needed for congestion or cough, Disp: 180 mL, Rfl: 0  •  famotidine (PEPCID) 40 MG tablet, Take 1 tablet (40 mg total) by mouth daily (Patient taking differently: Take 40 mg by mouth 2 (two) times a day as needed  ), Disp: 30 tablet, Rfl: 1  •  fluticasone (FLONASE) 50 mcg/act nasal spray, 1 spray into each nostril 2 (two) times a day (Patient not taking: Reported on 7/18/2022), Disp: 10 mL, Rfl: 3  •  ipratropium (ATROVENT) 0 03 % nasal spray, 2 sprays into each nostril every 12 (twelve) hours, Disp: 10 mL, Rfl: 1  •  Lasmiditan Succinate (REYVOW) 50 MG tablet, Take 1 tablet (50 mg) one time as needed for migraine   Do not use more than one dose per day, or more than four doses per month, Disp: 8 tablet, Rfl: 3  •  levonorgestrel (MIRENA) 20 MCG/24HR IUD, 1 each by Intrauterine route once, Disp: , Rfl:   •  Magnesium 250 MG TABS, Take 1 tablet by mouth daily  , Disp: , Rfl:   •  Riboflavin (VITAMIN B2 PO), Take by mouth, Disp: , Rfl:   •  verapamil (VERELAN PM) 120 MG 24 hr capsule, Take 1 capsule (120 mg total) by mouth daily at bedtime After 2 weeks, increase to 2 capsules by mouth daily at bedtime  , Disp: 90 capsule, Rfl: 3    Allergies   Allergen Reactions   • Latex Hives       Social History     Socioeconomic History   • Marital status: Single     Spouse name: Not on file   • Number of children: Not on file   • Years of education: college student   • Highest education level: Not on file   Occupational History   • Not on file   Tobacco Use   • Smoking status: Never Smoker   • Smokeless tobacco: Never Used   Vaping Use   • Vaping Use: Never used   Substance and Sexual Activity   • Alcohol use: Not Currently     Alcohol/week: 0 0 standard drinks     Comment: Only drink at social events   • Drug use: Never   • Sexual activity: Never     Birth control/protection: I U D  Comment: Mirena   Other Topics Concern   • Not on file   Social History Narrative    Caffeine use    Lives with family     Social Determinants of Health     Financial Resource Strain: Not on file   Food Insecurity: Not on file   Transportation Needs: Not on file   Physical Activity: Not on file   Stress: Not on file   Social Connections: Not on file   Intimate Partner Violence: Not on file   Housing Stability: Not on file       Review of Systems     ROS:  Constitutional: Negative for fatigue and unexpected weight change  Respiratory: Negative for cough and shortness of breath  Cardiovascular: Negative for chest pain and palpitations  Gastrointestinal: Negative for abdominal pain and change in bowel habits  Breasts:  Negative, other than as noted above  Genitourinary: Negative, other than as noted above  Psychiatric: Negative for mood difficulties        Objective         Vitals:    10/25/22 0745   BP: 102/64         Physical Examination:    Patient appears well and is not in distress  Neck is supple without masses, no cervical or supraclavicular lymphadenopathy  Cardiovascular: regular rate and rhythm; no murmurs  Lungs: clear to auscultation bilaterally; no wheezes  Breasts are symmetrical without mass, tenderness, nipple discharge, skin changes or adenopathy  Abdomen is soft and nontender without masses  External genitals are normal without lesions or rashes  Urethral meatus and urethra are normal  Bladder is normal to palpation  Vagina is normal without discharge or bleeding  Cervix is normal without discharge or lesion  + IUD strings; Strings are on the longer side; no pain or evidence of IUD in improper position  Uterus is normal, mobile, nontender without palpable mass  Adnexa are normal, nontender, without palpable mass

## 2022-10-25 ENCOUNTER — ANNUAL EXAM (OUTPATIENT)
Dept: OBGYN CLINIC | Facility: CLINIC | Age: 27
End: 2022-10-25

## 2022-10-25 VITALS
SYSTOLIC BLOOD PRESSURE: 102 MMHG | BODY MASS INDEX: 20.06 KG/M2 | DIASTOLIC BLOOD PRESSURE: 64 MMHG | HEIGHT: 67 IN | WEIGHT: 127.8 LBS

## 2022-10-25 DIAGNOSIS — Z01.419 ENCNTR FOR GYN EXAM (GENERAL) (ROUTINE) W/O ABN FINDINGS: ICD-10-CM

## 2023-01-11 ENCOUNTER — OFFICE VISIT (OUTPATIENT)
Dept: OBGYN CLINIC | Facility: CLINIC | Age: 28
End: 2023-01-11

## 2023-01-11 VITALS — WEIGHT: 124.6 LBS | SYSTOLIC BLOOD PRESSURE: 108 MMHG | DIASTOLIC BLOOD PRESSURE: 66 MMHG | BODY MASS INDEX: 19.56 KG/M2

## 2023-01-11 DIAGNOSIS — Z30.432 ENCOUNTER FOR IUD REMOVAL: ICD-10-CM

## 2023-01-11 DIAGNOSIS — Z11.3 SCREENING FOR STDS (SEXUALLY TRANSMITTED DISEASES): ICD-10-CM

## 2023-01-11 DIAGNOSIS — Z30.430 ENCOUNTER FOR INSERTION OF MIRENA IUD: Primary | ICD-10-CM

## 2023-01-11 NOTE — PROGRESS NOTES
Iud insertions    Date/Time: 1/11/2023 4:39 PM  Performed by: Justyna Keys PA-C  Authorized by: Justyna Keys PA-C     Other Assisting Provider: No    Verbal consent obtained?: Yes    Written consent obtained?: Yes    Risks and benefits: Risks, benefits and alternatives were discussed    Consent given by:  Patient  Time Out:     Time out: Immediately prior to the procedure a time out was called    Patient states understanding of procedure being performed: Yes    Patient's understanding of procedure matches consent: Yes    Procedure consent matches procedure scheduled: Yes    Relevant documents present and verified: Yes    Required items: Required blood products, implants, devices and special equipment available    Patient identity confirmed:  Verbally with patient  Procedure:     Pelvic exam performed: yes      Negative GC/chlamydia test: collected today  Negative urine pregnancy test: collected specimen inadvertantly discarded  Mirena notably in place  Reviewed risk of insertion if pregnant  She reports low risk and concerns and desires to proceed        Cervix cleaned and prepped: yes      Speculum placed in vagina: yes      Tenaculum applied to cervix: yes      Uterus sounded: yes      Uterus sound depth (cm):  8    IUD inserted with no complications: yes      IUD type:  Mirena    Strings trimmed: yes    Post-procedure:     Patient tolerated procedure well: yes      Patient will follow up after next period: yes

## 2023-01-11 NOTE — PROGRESS NOTES
Iud removal    Date/Time: 1/11/2023 4:45 PM  Performed by: Vicenta Aceves PA-C  Authorized by: Vicenta Aceves PA-C   Universal Protocol:  Consent: Verbal consent obtained  Risks and benefits: risks, benefits and alternatives were discussed  Consent given by: patient  Time out: Immediately prior to procedure a "time out" was called to verify the correct patient, procedure, equipment, support staff and site/side marked as required    Patient understanding: patient states understanding of the procedure being performed  Patient consent: the patient's understanding of the procedure matches consent given  Procedure consent: procedure consent matches procedure scheduled  Relevant documents: relevant documents present and verified  Required items: required blood products, implants, devices, and special equipment available  Patient identity confirmed: verbally with patient      Procedure:     Removed with no complications: yes      Removal due to mechanical complications of IUD: no      Removal due to infection and inflammatory reaction: no      Other reason for removal:  Swap due to recurrence of heavy bleeding

## 2023-01-12 LAB
C TRACH DNA SPEC QL NAA+PROBE: NEGATIVE
N GONORRHOEA DNA SPEC QL NAA+PROBE: NEGATIVE

## 2023-02-03 ENCOUNTER — OFFICE VISIT (OUTPATIENT)
Dept: URGENT CARE | Age: 28
End: 2023-02-03

## 2023-02-03 VITALS
TEMPERATURE: 98 F | DIASTOLIC BLOOD PRESSURE: 78 MMHG | RESPIRATION RATE: 16 BRPM | OXYGEN SATURATION: 99 % | HEART RATE: 55 BPM | SYSTOLIC BLOOD PRESSURE: 107 MMHG

## 2023-02-03 DIAGNOSIS — J02.9 SORE THROAT: Primary | ICD-10-CM

## 2023-02-03 LAB — S PYO AG THROAT QL: NEGATIVE

## 2023-02-03 RX ORDER — FLUTICASONE PROPIONATE 50 MCG
1 SPRAY, SUSPENSION (ML) NASAL DAILY
Qty: 11.1 ML | Refills: 0 | Status: SHIPPED | OUTPATIENT
Start: 2023-02-03

## 2023-02-03 RX ORDER — BROMPHENIRAMINE MALEATE, PSEUDOEPHEDRINE HYDROCHLORIDE, AND DEXTROMETHORPHAN HYDROBROMIDE 2; 30; 10 MG/5ML; MG/5ML; MG/5ML
5 SYRUP ORAL 4 TIMES DAILY PRN
Qty: 120 ML | Refills: 0 | Status: SHIPPED | OUTPATIENT
Start: 2023-02-03

## 2023-02-03 NOTE — PATIENT INSTRUCTIONS
Rapid strep performed in office negative, throat culture results pending and [facility] 24 to [facility] hours  Continue conservative measures such as hot tea with honey, warm salt water gargles, Tylenol/ibuprofen as needed for symptoms  Begin Flonase daily, Bromfed 4 times daily as needed for congestion  Ensure adequate hydration  Trial humidifier/hot shower steam   Follow-up with primary care provider if symptoms do not resolve within 1 to 2 weeks

## 2023-02-03 NOTE — PROGRESS NOTES
[address] [facility]        NAME: Junito Kamara is a 33 y o  female  : 1995    MRN: [id number]  DATE: February 3, 2023  TIME: 6:24 PM    Assessment and Plan   Sore throat [J02 9]  1  Sore throat  POCT rapid strepA    Throat culture    fluticasone (FLONASE) 50 mcg/act nasal spray    brompheniramine-pseudoephedrine-DM 30-2-10 MG/5ML syrup      Rapid strep performed in office negative, throat culture results pending and [facility] to [facility]  Continue conservative measures such as hot tea with honey, warm salt water gargles, Tylenol/ibuprofen as needed for symptoms  Begin Flonase daily, Bromfed 4 times daily as needed for congestion  Ensure adequate hydration  Trial humidifier/hot shower steam   Follow-up with primary care provider if symptoms do not resolve within 1 to 2 weeks  Patient Instructions   Pharyngitis   WHAT YOU NEED TO KNOW:   Pharyngitis, or sore throat, is inflammation of the tissues and structures in your pharynx (throat)  Pharyngitis is most often caused by bacteria  It may also be caused by a cold or flu virus  Other causes include smoking, allergies, or acid reflux  DISCHARGE INSTRUCTIONS:   Call 911 for any of the following:   • You have trouble breathing or swallowing because your throat is swollen or sore         Return to the emergency department if:   • You are drooling because it hurts too much to swallow      • Your fever is higher than 102? F (39?C) or lasts longer than 3 days      • You are confused      • You taste blood in your throat      Contact your healthcare provider if:   • Your throat pain gets worse      • You have a painful lump in your throat that does not go away after 5 days      • Your symptoms do not improve after 5 days      • You have questions or concerns about your condition or care      Medicines:  Viral pharyngitis will go away on its own without treatment   Your sore throat should start to feel better in 3 to 5 days for both viral and bacterial infections  You may need any of the following:  • Antibiotics  treat a bacterial infection      • NSAIDs , such as ibuprofen, help decrease swelling, pain, and fever  NSAIDs can cause stomach bleeding or kidney problems in certain people  If you take blood thinner medicine, always ask your healthcare provider if NSAIDs are safe for you  Always read the medicine label and follow directions      • Acetaminophen  decreases pain and fever  It is available without a doctor's order  Ask how much to take and how often to take it  Follow directions  Acetaminophen can cause liver damage if not taken correctly      • Take your medicine as directed  Contact your healthcare provider if you think your medicine is not helping or if you have side effects  Tell him or her if you are allergic to any medicine  Keep a list of the medicines, vitamins, and herbs you take  Include the amounts, and when and why you take them  Bring the list or the pill bottles to follow-up visits  Carry your medicine list with you in case of an emergency      Manage your symptoms:   • Gargle salt water  Mix ¼ teaspoon salt in an 8 ounce glass of warm water and gargle  This may help decrease swelling in your throat      • Drink liquids as directed  You may need to drink more liquids than usual  Liquids may help soothe your throat and prevent dehydration  Ask how much liquid to drink each day and which liquids are best for you      • Use a cool-steam humidifier  to help moisten the air in your room and calm your cough      • Soothe your throat  with cough drops, ice, soft foods, or popsicles      Prevent the spread of pharyngitis:  Cover your mouth and nose when you cough or sneeze  Do not share food or drinks  Wash your hands often  Use soap and water  If soap and water are unavailable, use an alcohol based hand      Follow up with your doctor as directed:  Write down your questions so you remember to ask them during your visits  © Copyright [employer] 2022 Information is for End User's use only and may not be sold, redistributed or otherwise used for commercial purposes  All illustrations and images included in CareNotes® are the copyrighted property of [employer]  or Mayi Miramontes  The above information is an  only  It is not intended as medical advice for individual conditions or treatments  Talk to your doctor, nurse or pharmacist before following any medical regimen to see if it is safe and effective for you      Postnasal Drip   AMBULATORY CARE:   Postnasal drip  is a condition that causes a large amount of mucus to collect in your throat or nose  It may also be called upper airway cough syndrome because the mucus causes repeated coughing  You may have a sore throat, or throat tissues may swell  This may feel like a lump in your throat  You may also feel like you need to clear your throat often  Contact your healthcare provider if:   • You have trouble breathing because of the mucus      • You have new or worsening symptoms, even with treatment      • You have signs of an infection, such as yellow or green mucus, or a fever      • You have questions or concerns about your condition or care      Treatment  may include any of the following:  • Medicines  may be given to thin the mucus  You may need to swallow the medicine or use a device to flush your sinuses with liquid squirted into your nose  Nasal sprays may also be needed to keep the tissues in your nose moist  Medicines can also relieve congestion  Allergy medicine may help if your symptoms are caused by seasonal allergies, such as hay fever  You may need medicine to help control GERD      • Antibiotics  may be needed to treat a bacterial infection      Manage postnasal drip:   • Use a humidifier or vaporizer  Use a cool mist humidifier or a vaporizer to increase air moisture in your home  This may make it easier for you to breathe     • Drink more liquids as directed  Liquids help keep your air passages moist and help you cough up mucus  Ask how much liquid to drink each day and which liquids are best for you       • Avoid cold air and dry, heated air  Cold or dry air can trigger postnasal drip  Try to stay inside on cold days, or keep your mouth covered  Do not stay long in areas that have dry, heated air      • Do not smoke, and avoid secondhand smoke  Nicotine and other chemicals in cigarettes and cigars can irritate your throat and make coughing worse  Ask your healthcare provider for information if you currently smoke and need help to quit  E-cigarettes or smokeless tobacco still contain nicotine  Talk to your healthcare provider before you use these products      Follow up with your doctor as directed:  Write down your questions so you remember to ask them during your visits  © Copyright [employer] 2022 Information is for End User's use only and may not be sold, redistributed or otherwise used for commercial purposes  All illustrations and images included in CareNotes® are the copyrighted property of [employer]  or [facility] [address]   The above information is an  only  It is not intended as medical advice for individual conditions or treatments  Talk to your doctor, nurse or pharmacist before following any medical regimen to see if it is safe and effective for you             Follow up with PCP in 3-5 days  Proceed to  ER if symptoms worsen  Chief Complaint   No chief complaint on file  History of Present Illness       Patient is a 52-year-old female with past medical history significant for migraines who presents for evaluation of sore throat, postnasal drip over the past week  She has tried over-the-counter lozenges with little relief  She denies fever, cough, body aches, chills, nausea/vomiting/diarrhea        Review of Systems   Review of Systems   Constitutional: Negative for fatigue and fever    HENT: Positive for postnasal drip and sore throat  Negative for congestion, ear discharge, ear pain, rhinorrhea, sinus pressure, sinus pain and sneezing  Eyes: Negative  Negative for pain, discharge, redness and itching  Respiratory: Negative  Negative for apnea, cough, choking, chest tightness, shortness of breath and stridor  Cardiovascular: Negative  Negative for chest pain and palpitations  Gastrointestinal: Negative  Negative for diarrhea, nausea and vomiting  Endocrine: Negative  Negative for polydipsia, polyphagia and polyuria  Genitourinary: Negative  Negative for decreased urine volume and flank pain  Musculoskeletal: Negative  Negative for arthralgias, back pain, myalgias, neck pain and neck stiffness  Skin: Negative  Negative for color change and rash  Allergic/Immunologic: Negative  Negative for environmental allergies  Neurological: Negative  Negative for dizziness, facial asymmetry, light-headedness, numbness and headaches  Hematological: Negative  Negative for adenopathy  Psychiatric/Behavioral: Negative  Current Medications       Current Outpatient Medications:   •  brompheniramine-pseudoephedrine-DM 30-2-10 MG/5ML syrup, Take 5 mL by mouth 4 (four) times a day as needed for congestion or allergies, Disp: 120 mL, Rfl: 0  •  fluticasone (FLONASE) 50 mcg/act nasal spray, 1 spray into each nostril daily, Disp: 11 1 mL, Rfl: 0  •  Lasmiditan Succinate (REYVOW) 50 MG tablet, Take 1 tablet (50 mg) one time as needed for migraine   Do not use more than one dose per day, or more than four doses per month, Disp: 8 tablet, Rfl: 3  •  levonorgestrel (MIRENA) 20 MCG/24HR IUD, 1 each by Intrauterine route once, Disp: , Rfl:   •  Magnesium 250 MG TABS, Take 1 tablet by mouth daily  , Disp: , Rfl:   •  Riboflavin (VITAMIN B2 PO), Take by mouth, Disp: , Rfl:   •  verapamil (VERELAN PM) 120 MG 24 hr capsule, Take 1 capsule (120 mg total) by mouth daily at bedtime After 2 weeks, increase to 2 capsules by mouth daily at bedtime  , Disp: 90 capsule, Rfl: 3    Current Allergies     Allergies as of 02/03/2023 - Reviewed 01/11/2023   Allergen Reaction Noted   • Latex Hives 03/28/2018            The following portions of the patient's history were reviewed and updated as appropriate: allergies, current medications, past family history, past medical history, past social history, past surgical history and problem list      Past Medical History:   Diagnosis Date   • Allergic 2014    Seasonal   • Eating disorder [id number]    past, not current   • Headache(784 0) 2011    hemiplegic migraines   • Migraine    • Migraine with aura    • Visual impairment 2003    glasses       Past Surgical History:   Procedure Laterality Date   • CYST REMOVAL     • NO PAST SURGERIES     • WISDOM TOOTH EXTRACTION         Family History   Problem Relation Age of Onset   • Migraines Mother    • No Known Problems Father    • Migraines Sister    • Migraines Maternal Grandmother    • Miscarriages / Stillbirths Maternal Grandmother    • Osteoporosis Maternal Grandmother    • Diabetes Maternal Grandfather    • Arthritis Paternal Grandmother         hands, knees   • Colon cancer Paternal Grandfather 76   • Cancer Paternal Grandfather    • Migraines Family    • Mental illness Sister         Anxiety   • Anxiety disorder Sister    • Migraines Sister    • Mental illness Sister         Anxiety   • Anxiety disorder Sister    • Breast cancer Neg Hx          Medications have been verified  Objective   /78   Pulse 55   Temp 98 °F (36 7 °C)   Resp 16   LMP 01/04/2023   SpO2 99%        Physical Exam     Physical Exam  Vitals and nursing note reviewed  Constitutional:       General: She is not in acute distress  Appearance: Normal appearance  She is not ill-appearing, toxic-appearing or diaphoretic  Interventions: She is not intubated  HENT:      Head: Normocephalic and atraumatic        Right Ear: Tympanic membrane, ear canal and external ear normal  There is no impacted cerumen  Left Ear: Tympanic membrane, ear canal and external ear normal  There is no impacted cerumen  Nose: Nose normal  No congestion or rhinorrhea  Mouth/Throat:      Mouth: Mucous membranes are moist       Pharynx: Oropharynx is clear  Uvula midline  No pharyngeal swelling, oropharyngeal exudate, posterior oropharyngeal erythema or uvula swelling  Tonsils: No tonsillar exudate or tonsillar abscesses  1+ on the right  1+ on the left  Eyes:      Extraocular Movements: Extraocular movements intact  Conjunctiva/sclera: Conjunctivae normal       Pupils: Pupils are equal, round, and reactive to light  Cardiovascular:      Rate and Rhythm: Normal rate and regular rhythm  Pulses: Normal pulses  Heart sounds: Normal heart sounds, S1 normal and S2 normal  Heart sounds not distant  No murmur heard  No friction rub  No gallop  Pulmonary:      Effort: Pulmonary effort is normal  No tachypnea, bradypnea, accessory muscle usage, prolonged expiration, respiratory distress or retractions  She is not intubated  Breath sounds: Normal breath sounds  No stridor, decreased air movement or transmitted upper airway sounds  No decreased breath sounds, wheezing, rhonchi or rales  Abdominal:      General: Bowel sounds are normal       Palpations: Abdomen is soft  Tenderness: There is no abdominal tenderness  There is no guarding or rebound  Musculoskeletal:         General: Normal range of motion  Cervical back: Full passive range of motion without pain, normal range of motion and neck supple  No rigidity or tenderness  No spinous process tenderness or muscular tenderness  Normal range of motion  Lymphadenopathy:      Cervical: No cervical adenopathy  Right cervical: No superficial cervical adenopathy  Left cervical: No superficial cervical adenopathy     Skin:     General: Skin is warm and dry       Capillary Refill: Capillary refill takes less than 2 seconds  Neurological:      General: No focal deficit present  Mental Status: She is alert and oriented to person, place, and time  Cranial Nerves: No cranial nerve deficit     Psychiatric:         Mood and Affect: Mood normal          Behavior: Behavior normal

## 2023-02-05 LAB — BACTERIA THROAT CULT: NORMAL

## 2023-02-20 ENCOUNTER — OFFICE VISIT (OUTPATIENT)
Dept: OBGYN CLINIC | Facility: CLINIC | Age: 28
End: 2023-02-20

## 2023-02-20 VITALS — SYSTOLIC BLOOD PRESSURE: 90 MMHG | DIASTOLIC BLOOD PRESSURE: 68 MMHG | WEIGHT: 127.8 LBS | BODY MASS INDEX: 20.06 KG/M2

## 2023-02-20 DIAGNOSIS — Z97.5 IUD (INTRAUTERINE DEVICE) IN PLACE: Primary | ICD-10-CM

## 2023-02-20 NOTE — PROGRESS NOTES
Justino Robledo  1995      CC: IUD check    S: 33 y o  female here for IUD check  She is status post placement of a Mirena IUD on 1/11/2023  She has had one period since insertion that was light and not bothersome to her  She has had no pain or other side effects  No LMP recorded  Patient has had an implant  Current Outpatient Medications:   •  Lasmiditan Succinate (REYVOW) 50 MG tablet, Take 1 tablet (50 mg) one time as needed for migraine  Do not use more than one dose per day, or more than four doses per month, Disp: 8 tablet, Rfl: 3  •  levonorgestrel (MIRENA) 20 MCG/24HR IUD, 1 each by Intrauterine route once, Disp: , Rfl:   •  Magnesium 250 MG TABS, Take 1 tablet by mouth daily  , Disp: , Rfl:   •  verapamil (VERELAN PM) 120 MG 24 hr capsule, Take 1 capsule (120 mg total) by mouth daily at bedtime After 2 weeks, increase to 2 capsules by mouth daily at bedtime  , Disp: 90 capsule, Rfl: 3  •  brompheniramine-pseudoephedrine-DM 30-2-10 MG/5ML syrup, Take 5 mL by mouth 4 (four) times a day as needed for congestion or allergies (Patient not taking: Reported on 2/20/2023), Disp: 120 mL, Rfl: 0  •  fluticasone (FLONASE) 50 mcg/act nasal spray, 1 spray into each nostril daily (Patient not taking: Reported on 2/20/2023), Disp: 11 1 mL, Rfl: 0  •  Riboflavin (VITAMIN B2 PO), Take by mouth (Patient not taking: Reported on 2/20/2023), Disp: , Rfl:   Social History     Socioeconomic History   • Marital status: Single     Spouse name: Not on file   • Number of children: Not on file   • Years of education: college student   • Highest education level: Not on file   Occupational History   • Not on file   Tobacco Use   • Smoking status: Never   • Smokeless tobacco: Never   Vaping Use   • Vaping Use: Never used   Substance and Sexual Activity   • Alcohol use: Not Currently     Comment: Only drink at social events   • Drug use: Never   • Sexual activity: Never     Birth control/protection: I U D       Comment: Mirena 1/11/23   Other Topics Concern   • Not on file   Social History Narrative    Caffeine use    Lives with family     Social Determinants of Health     Financial Resource Strain: Not on file   Food Insecurity: Not on file   Transportation Needs: Not on file   Physical Activity: Not on file   Stress: Not on file   Social Connections: Not on file   Intimate Partner Violence: Not on file   Housing Stability: Not on file     Family History   Problem Relation Age of Onset   • Migraines Mother    • No Known Problems Father    • Migraines Sister    • Migraines Maternal Grandmother    • Miscarriages / Stillbirths Maternal Grandmother    • Osteoporosis Maternal Grandmother    • Diabetes Maternal Grandfather    • Arthritis Paternal Grandmother         hands, knees   • Colon cancer Paternal Grandfather 76   • Cancer Paternal Grandfather    • Migraines Family    • Mental illness Sister         Anxiety   • Anxiety disorder Sister    • Migraines Sister    • Mental illness Sister         Anxiety   • Anxiety disorder Sister    • Breast cancer Neg Hx      Past Medical History:   Diagnosis Date   • Allergic 2014    Seasonal   • Eating disorder [id number]    past, not current   • Headache(784 0) 2011    hemiplegic migraines   • Migraine    • Migraine with aura    • Visual impairment 2003    glasses       Review of Systems   Respiratory: Negative  Cardiovascular: Negative  Gastrointestinal: Negative for constipation and diarrhea  Genitourinary: Negative for difficulty urinating, pelvic pain, vaginal bleeding, vaginal discharge, itching or odor  O:  Blood pressure 90/68, weight 58 kg (127 lb 12 8 oz)  Abdomen is soft and nontender  External genitals are normal without rashes or lesions  Vagina is normal without discharge or bleeding  Cervix is normal without discharge or lesion  IUD strings are normal    No focal neurological deficits  Normal mood, affect, behavior       A:  IUD in place    P:  Patient was reassured that irregular bleeding is common for the first six months of IUD use and that this should slowly resolve over time  She will call with any persistently abnormal bleeding or other problems  She will return for her yearly exam as scheduled

## 2023-04-03 DIAGNOSIS — G43.409 HEMIPLEGIC MIGRAINE WITHOUT STATUS MIGRAINOSUS, NOT INTRACTABLE: ICD-10-CM

## 2023-04-04 RX ORDER — VERAPAMIL HYDROCHLORIDE 120 MG/1
120 CAPSULE, EXTENDED RELEASE ORAL
Qty: 90 CAPSULE | Refills: 0 | Status: SHIPPED | OUTPATIENT
Start: 2023-04-04

## 2023-05-15 ENCOUNTER — OFFICE VISIT (OUTPATIENT)
Dept: FAMILY MEDICINE CLINIC | Facility: CLINIC | Age: 28
End: 2023-05-15

## 2023-05-15 VITALS
OXYGEN SATURATION: 97 % | DIASTOLIC BLOOD PRESSURE: 64 MMHG | BODY MASS INDEX: 21.21 KG/M2 | TEMPERATURE: 97.9 F | SYSTOLIC BLOOD PRESSURE: 108 MMHG | HEIGHT: 66 IN | HEART RATE: 103 BPM | WEIGHT: 132 LBS

## 2023-05-15 DIAGNOSIS — Z13.31 POSITIVE DEPRESSION SCREENING: ICD-10-CM

## 2023-05-15 DIAGNOSIS — Z00.00 ANNUAL PHYSICAL EXAM: Primary | ICD-10-CM

## 2023-05-15 DIAGNOSIS — G43.109 MIGRAINE WITH AURA AND WITHOUT STATUS MIGRAINOSUS, NOT INTRACTABLE: ICD-10-CM

## 2023-05-15 NOTE — PROGRESS NOTES
[address]    NAME: Gricelda Hairston  AGE: 33 y o  SEX: female  : 1995   DATE: 5/15/2023     Assessment and Plan:     Problem List Items Addressed This Visit        Cardiovascular and Mediastinum    Migraine with aura and without status migrainosus, not intractable    Relevant Orders    Comprehensive metabolic panel   Other Visit Diagnoses     Annual physical exam    -  Primary    Positive depression screening            Immunizations and preventive care screenings were discussed with patient today  Appropriate education was printed on patient's after visit summary  Counseling:  Alcohol/drug use: discussed moderation in alcohol intake, the recommendations for healthy alcohol use, and avoidance of illicit drug use  Dental Health: discussed importance of regular tooth brushing, flossing, and dental visits  Injury prevention: discussed safety/seat belts, safety helmets, smoke detectors, carbon dioxide detectors, and smoking near bedding or upholstery  Exercise: the importance of regular exercise/physical activity was discussed  Recommend exercise 3-5 times per week for at least 30 minutes  Depression Screening and Follow-up Plan: Patient's depression screening was positive with a PHQ-2 score of 4  Their PHQ-9 score was 9  Patient assessed for underlying major depression  Brief counseling provided and recommend additional follow-up/re-evaluation next office visit  Return in about 1 year (around 5/15/2024) for Annual physical      Chief Complaint:     Chief Complaint   Patient presents with   • Physical Exam      History of Present Illness:     Adult Annual Physical   Patient here for a comprehensive physical exam  The patient reports no problems  +depression screen she says work-related stress, open to counseling referral      Diet and Physical Activity  Diet/Nutrition: well balanced diet     Exercise: no formal exercise  Depression Screening  PHQ-2/9 Depression Screening    Little interest or pleasure in doing things: 2 - more than half the days  Feeling down, depressed, or hopeless: 2 - more than half the days  Trouble falling or staying asleep, or sleeping too much: 1 - several days  Feeling tired or having little energy: 1 - several days  Poor appetite or overeatin - several days  Feeling bad about yourself - or that you are a failure or have let yourself or your family down: 1 - several days  Trouble concentrating on things, such as reading the newspaper or watching television: 1 - several days  Moving or speaking so slowly that other people could have noticed  Or the opposite - being so fidgety or restless that you have been moving around a lot more than usual: 0 - not at all  Thoughts that you would be better off dead, or of hurting yourself in some way: 0 - not at all  PHQ-2 Score: 4  PHQ-2 Interpretation: POSITIVE depression screen  PHQ-9 Score: 9   PHQ-9 Interpretation: Mild depression        General Health  Sleep: sleeps well  Hearing: normal - bilateral   Vision: most recent eye exam <1 year ago and wears glasses  Dental: regular dental visits  /GYN Health  Last menstrual period: No LMP recorded (lmp unknown)  Patient has had an implant  Contraceptive method: IUD placement  Review of Systems:     Review of Systems   Constitutional: Negative for activity change, chills and fever  HENT: Negative for congestion, rhinorrhea and sore throat  Eyes: Negative for visual disturbance  Respiratory: Negative for cough, shortness of breath and wheezing  Cardiovascular: Negative for chest pain and palpitations  Gastrointestinal: Negative for abdominal pain, blood in stool, constipation, diarrhea, nausea and vomiting  Genitourinary: Negative for dysuria  Musculoskeletal: Negative for arthralgias and myalgias  Skin: Negative for rash     Neurological: Negative for dizziness, weakness and headaches  Psychiatric/Behavioral: Positive for dysphoric mood (work-related)  All other systems reviewed and are negative  Past Medical History:     Past Medical History:   Diagnosis Date   • Allergic 2014    Seasonal   • Eating disorder [id number]    past, not current   • Headache(784 0) 2011    hemiplegic migraines   • Migraine    • Migraine with aura    • Visual impairment 2003    glasses      Past Surgical History:     Past Surgical History:   Procedure Laterality Date   • CYST REMOVAL     • NO PAST SURGERIES     • WISDOM TOOTH EXTRACTION        Social History:     Social History     Socioeconomic History   • Marital status: Single     Spouse name: None   • Number of children: None   • Years of education: college student   • Highest education level: None   Occupational History   • None   Tobacco Use   • Smoking status: Never     Passive exposure: Never   • Smokeless tobacco: Never   Vaping Use   • Vaping Use: Never used   Substance and Sexual Activity   • Alcohol use: Not Currently   • Drug use: Never   • Sexual activity: Not Currently     Birth control/protection: I U D       Comment: Mirena 1/11/23   Other Topics Concern   • None   Social History Narrative    Caffeine use    Lives with family     Social Determinants of Health     Financial Resource Strain: Not on file   Food Insecurity: Not on file   Transportation Needs: Not on file   Physical Activity: Not on file   Stress: Not on file   Social Connections: Not on file   Intimate Partner Violence: Not on file   Housing Stability: Not on file      Family History:     Family History   Problem Relation Age of Onset   • Migraines Mother    • No Known Problems Father    • Migraines Sister    • Migraines Maternal Grandmother    • Miscarriages / [place] Maternal Grandmother    • Osteoporosis Maternal Grandmother    • Diabetes Maternal Grandfather    • Arthritis Paternal Grandmother         hands, knees   • Colon cancer Paternal Grandfather "76   • Cancer Paternal Grandfather    • Migraines Family    • Mental illness Sister         Anxiety   • Anxiety disorder Sister    • Migraines Sister    • Mental illness Sister         Anxiety   • Anxiety disorder Sister    • Breast cancer Neg Hx       Current Medications:     Current Outpatient Medications   Medication Sig Dispense Refill   • Lasmiditan Succinate (REYVOW) 50 MG tablet Take 1 tablet (50 mg) one time as needed for migraine  Do not use more than one dose per day, or more than four doses per month 8 tablet 3   • levonorgestrel (MIRENA) 20 MCG/24HR IUD 1 each by Intrauterine route once     • Magnesium 250 MG TABS Take 1 tablet by mouth daily       • verapamil (VERELAN PM) 120 MG 24 hr capsule Take 1 capsule (120 mg total) by mouth daily at bedtime After 2 weeks, increase to 2 capsules by mouth daily at bedtime  90 capsule 0     No current facility-administered medications for this visit  Allergies: Allergies   Allergen Reactions   • Latex Hives      Physical Exam:     /64   Pulse 103   Temp 97 9 °F (36 6 °C)   Ht 5' 6\" (1 676 m)   Wt 59 9 kg (132 lb)   LMP  (LMP Unknown)   SpO2 97%   BMI 21 31 kg/m²     Physical Exam  Vitals and nursing note reviewed  Constitutional:       General: She is not in acute distress  Appearance: She is well-developed  She is not ill-appearing  HENT:      Head: Normocephalic and atraumatic  Right Ear: Tympanic membrane, ear canal and external ear normal  No middle ear effusion  Left Ear: Tympanic membrane, ear canal and external ear normal   No middle ear effusion  Nose: Nose normal  No congestion or rhinorrhea  Mouth/Throat:      Lips: Pink  Mouth: Mucous membranes are moist       Pharynx: Oropharynx is clear  Uvula midline  No oropharyngeal exudate  Tonsils: No tonsillar exudate  Eyes:      General: Lids are normal       Extraocular Movements: Extraocular movements intact        Conjunctiva/sclera: Conjunctivae " normal       Pupils: Pupils are equal, round, and reactive to light  Comments: Wears glasses    Neck:      Thyroid: No thyromegaly  Trachea: No tracheal deviation  Cardiovascular:      Rate and Rhythm: Normal rate and regular rhythm  Pulses: Normal pulses  Heart sounds: Normal heart sounds, S1 normal and S2 normal  No murmur heard  Pulmonary:      Effort: Pulmonary effort is normal  No respiratory distress  Breath sounds: Normal breath sounds  No decreased breath sounds, wheezing, rhonchi or rales  Abdominal:      General: Bowel sounds are normal  There is no distension  Palpations: Abdomen is soft  Tenderness: There is no abdominal tenderness  Musculoskeletal:      Right lower leg: No edema  Left lower leg: No edema  Lymphadenopathy:      Cervical: No cervical adenopathy  Skin:     General: Skin is warm and dry  Capillary Refill: Capillary refill takes less than 2 seconds  Neurological:      Mental Status: She is alert and oriented to person, place, and time  Deep Tendon Reflexes: Reflexes normal       Reflex Scores:       Patellar reflexes are 2+ on the right side and 2+ on the left side  Psychiatric:         Attention and Perception: Attention normal          Mood and Affect: Mood normal          Thought Content: Thought content does not include suicidal ideation            Christen Leblanc MD   [facility]

## 2023-05-15 NOTE — PATIENT INSTRUCTIONS
"Promotion of [place]  Dr Mary Whittington recommends the following for the promotion of mental health:    Counseling/therapy may be of help to you  [address] [facility]  If you are taking medication, you must take it as prescribed  Do not stop taking it or change doses without speaking to your doctor  I encourage daily mindfulness habits including:  Go outside  Breathing exercises daily  Meditations or guided relaxation  Sleep hygiene (going to bed and waking up at the same time every day, even weekends)  Fueling your body and mind with water and nutritious food throughout the day  Let your friends and family know how you're feeling  Give them the opportunity to support you  Do not isolate yourself  Similarly, you may want to spend less time with people in your life who have bad habits you are trying to eliminate, or those who bring you down  Be mindful of habits like smoking, drinking alcohol, and the use of other substances  I recommend a \"clean\" lifestyle to improve your mental health  Use smart phone apps and YouTube to find meditations and breathing exercises:  Free apps I like: Insight Timer, Woebot, Breethe, Breathe+, MyLife Meditation --> note some of these apps have free and paid sections, so you may not be able to access all features  Paid apps I like: Headspace, Breathing Zone, [place], Calm  I am not sponsored by nor do I receive money from these apps, and they are not officially recommended by [employer]  I recommend them because I use them or my patients use them with success     If you ever feel like you may hurt yourself or someone else, please call 9-1-1, text [phone], or go to the nearest emergency department    I also recommend signing up for My Chart if you haven't already, which is the [facility] kassandra, so that you can send me messages to ask questions through the My Chart portal      [email or web address]    National Suicide " Prevention Hotline: 0-931.512.9310  If you or someone you know is suicidal or in emotional distress, contact the [address] [facility]  Trained crisis workers are available to talk 24 hours a day, 7 days a week  Your confidential and toll-free call goes to the nearest crisis center in the [facility]  These centers provide crisis counseling and mental health referrals  If you or someone you know is in immediate danger, please call 9-1-1  [facility] Treatment Referral Helpline: 4-502.757.1052  Get general information on mental health and locate treatment services in your area  Speak to a live person, Monday through Friday from 8 a m  to 8 p m  EST  Support Groups:  [facility]  Call intake to register: [address] [place] [address] Ne on Mental Illness:  [facility], [address]  [phone]  [email or web address]  They provide multiple services including support groups for those with mental illness, as well as the family members of individuals with mental illness    Wellness Visit for Adults   AMBULATORY CARE:   A wellness visit  is when you see your healthcare provider to get screened for health problems  Your healthcare provider will also give you advice on how to stay healthy  Write down your questions so you remember to ask them  Ask your healthcare provider how often you should have a wellness visit  What happens at a wellness visit:  Your healthcare provider will ask about your health, and your family history of health problems  This includes high blood pressure, heart disease, and cancer  He or she will ask if you have symptoms that concern you, if you smoke, and about your mood  You may also be asked about your intake of medicines, supplements, food, and alcohol  Any of the following may be done: Your weight  will be checked  Your height may also be checked so your body mass index (BMI) can be calculated   Your BMI shows if you are at a healthy weight  Your blood pressure  and heart rate will be checked  Your temperature may also be checked  Blood and urine tests  may be done  Blood tests may be done to check your cholesterol levels  Abnormal cholesterol levels increase your risk for heart disease and stroke  You may also need a blood or urine test to check for diabetes if you are at increased risk  Urine tests may be done to look for signs of an infection or kidney disease  A physical exam  includes checking your heartbeat and lungs with a stethoscope  Your healthcare provider may also check your skin to look for sun damage  Screening tests  may be recommended  A screening test is done to check for diseases that may not cause symptoms  The screening tests you may need depend on your age, gender, family history, and lifestyle habits  For example, colorectal screening may be recommended if you are 48years old or older  Screening tests you need if you are a woman:   A Pap smear  is used to screen for cervical cancer  Pap smears are usually done every 3 to 5 years depending on your age  You may need them more often if you have had abnormal Pap smear test results in the past  Ask your healthcare provider how often you should have a Pap smear  A mammogram  is an x-ray of your breasts to screen for breast cancer  Experts recommend mammograms every 2 years starting at age 48 years  You may need a mammogram at age 52 years or younger if you have an increased risk for breast cancer  Talk to your healthcare provider about when you should start having mammograms and how often you need them  Vaccines you may need:   Get an influenza vaccine  every year  The influenza vaccine protects you from the flu  Several types of viruses cause the flu  The viruses change over time, so new vaccines are made each year  Get a tetanus-diphtheria (Td) booster vaccine  every 10 years   This vaccine protects you against tetanus and diphtheria  Tetanus is a severe infection that may cause painful muscle spasms and lockjaw  Diphtheria is a severe bacterial infection that causes a thick covering in the back of your mouth and throat  Get a human papillomavirus (HPV) vaccine  if you are female and aged 23 to 32 or male 23 to 24 and never received it  This vaccine protects you from HPV infection  HPV is the most common infection spread by sexual contact  HPV may also cause vaginal, penile, and anal cancers  Get a pneumococcal vaccine  if you are aged 72 years or older  The pneumococcal vaccine is an injection given to protect you from pneumococcal disease  Pneumococcal disease is an infection caused by pneumococcal bacteria  The infection may cause pneumonia, meningitis, or an ear infection  Get a shingles vaccine  if you are 60 or older, even if you have had shingles before  The shingles vaccine is an injection to protect you from the varicella-zoster virus  This is the same virus that causes chickenpox  Shingles is a painful rash that develops in people who had chickenpox or have been exposed to the virus  How to eat healthy:  My Plate is a model for planning healthy meals  It shows the types and amounts of foods that should go on your plate  Fruits and vegetables make up about half of your plate, and grains and protein make up the other half  A serving of dairy is included on the side of your plate  The amount of calories and serving sizes you need depends on your age, gender, weight, and height  Examples of healthy foods are listed below:  Eat a variety of vegetables  such as dark green, red, and orange vegetables  You can also include canned vegetables low in sodium (salt) and frozen vegetables without added butter or sauces  Eat a variety of fresh fruits , canned fruit in 100% juice, frozen fruit, and dried fruit  Include whole grains  At least half of the grains you eat should be whole grains   Examples include whole-wheat bread, wheat pasta, brown rice, and whole-grain cereals such as oatmeal     Eat a variety of protein foods such as seafood (fish and shellfish), lean meat, and poultry without skin (turkey and chicken)  Examples of lean meats include pork leg, shoulder, or tenderloin, and beef round, sirloin, tenderloin, and extra lean ground beef  Other protein foods include eggs and egg substitutes, beans, peas, soy products, nuts, and seeds  Choose low-fat dairy products such as skim or 1% milk or low-fat yogurt, cheese, and cottage cheese  Limit unhealthy fats  such as butter, hard margarine, and shortening  Exercise:  Exercise at least 30 minutes per day on most days of the week  Some examples of exercise include walking, biking, dancing, and swimming  You can also fit in more physical activity by taking the stairs instead of the elevator or parking farther away from stores  Include muscle strengthening activities 2 days each week  Regular exercise provides many health benefits  It helps you manage your weight, and decreases your risk for type 2 diabetes, heart disease, stroke, and high blood pressure  Exercise can also help improve your mood  Ask your healthcare provider about the best exercise plan for you  General health and safety guidelines:   Do not smoke  Nicotine and other chemicals in cigarettes and cigars can cause lung damage  Ask your healthcare provider for information if you currently smoke and need help to quit  E-cigarettes or smokeless tobacco still contain nicotine  Talk to your healthcare provider before you use these products  Limit alcohol  A drink of alcohol is 12 ounces of beer, 5 ounces of wine, or 1½ ounces of liquor  Lose weight, if needed  Being overweight increases your risk of certain health conditions  These include heart disease, high blood pressure, type 2 diabetes, and certain types of cancer  Protect your skin  Do not sunbathe or use tanning beds   Use sunscreen with a SPF 13 or higher  Apply sunscreen at least 15 minutes before you go outside  Reapply sunscreen every 2 hours  Wear protective clothing, hats, and sunglasses when you are outside  Drive safely  Always wear your seatbelt  Make sure everyone in your car wears a seatbelt  A seatbelt can save your life if you are in an accident  Do not use your cell phone when you are driving  This could distract you and cause an accident  Pull over if you need to make a call or send a text message  Practice safe sex  Use latex condoms if are sexually active and have more than one partner  Your healthcare provider may recommend screening tests for sexually transmitted infections (STIs)  Wear helmets, lifejackets, and protective gear  Always wear a helmet when you ride a bike or motorcycle, go skiing, or play sports that could cause a head injury  Wear protective equipment when you play sports  Wear a lifejacket when you are on a boat or doing water sports  © Copyright [facility] 2022 Information is for End User's use only and may not be sold, redistributed or otherwise used for commercial purposes  The above information is an  only  It is not intended as medical advice for individual conditions or treatments  Talk to your doctor, nurse or pharmacist before following any medical regimen to see if it is safe and effective for you

## 2023-07-29 DIAGNOSIS — G43.409 HEMIPLEGIC MIGRAINE WITHOUT STATUS MIGRAINOSUS, NOT INTRACTABLE: ICD-10-CM

## 2023-07-31 RX ORDER — VERAPAMIL HYDROCHLORIDE 120 MG/1
120 CAPSULE, EXTENDED RELEASE ORAL
Qty: 90 CAPSULE | Refills: 0 | Status: SHIPPED | OUTPATIENT
Start: 2023-07-31 | End: 2023-08-02 | Stop reason: SDUPTHER

## 2023-08-02 DIAGNOSIS — G43.409 HEMIPLEGIC MIGRAINE WITHOUT STATUS MIGRAINOSUS, NOT INTRACTABLE: ICD-10-CM

## 2023-08-02 NOTE — TELEPHONE ENCOUNTER
Entered new verapamil prescription to reflect 90 day supply at 2 tabs daily     Please review and sign if agreeable     Thank you!

## 2023-08-02 NOTE — TELEPHONE ENCOUNTER
----- Message from Susana Rivera RN sent at 8/2/2023  9:29 AM EDT -----  Regarding: FW: Verapamil Refills  Contact: [phone]    ----- Message -----  From: Guero Carl  Sent: 8/1/2023   6:02 PM EDT  To: Neu[facility] Clinical Team 1  Subject: Verapamil Refills                                Hello,    My insurance will only cover 90 day supplies of this medication since it is considered a "maintenance drug." I see that 90 pills were requested for a 90 day supply, but was still rejected by my insurance because because the instructions say "2 pills per day" and so they said this is a 45 day supply. Can you please update the instructions on my prescription to one per day, which was discussed at my last appointment? Thank you!   Wedny Benz

## 2023-08-03 RX ORDER — VERAPAMIL HYDROCHLORIDE 120 MG/1
120 CAPSULE, EXTENDED RELEASE ORAL
Qty: 180 CAPSULE | Refills: 0 | Status: SHIPPED | OUTPATIENT
Start: 2023-08-03

## 2023-10-25 NOTE — PROGRESS NOTES
Patient presents for a routine annual visit  Last Pap Smear- 10/14/2021  HPV vaccine- pt states completed   LMP - no period   Birth control- mirena     nonsmoker  Never sexually active   STD testing - n/a  No family history of uterine, ovarian, cervical or breast cancer  No concerns/questions for today's visit

## 2023-10-27 ENCOUNTER — ANNUAL EXAM (OUTPATIENT)
Dept: OBGYN CLINIC | Facility: CLINIC | Age: 28
End: 2023-10-27
Payer: COMMERCIAL

## 2023-10-27 VITALS
BODY MASS INDEX: 20.89 KG/M2 | HEIGHT: 66 IN | DIASTOLIC BLOOD PRESSURE: 62 MMHG | SYSTOLIC BLOOD PRESSURE: 100 MMHG | WEIGHT: 130 LBS

## 2023-10-27 DIAGNOSIS — Z01.419 WELL WOMAN EXAM WITH ROUTINE GYNECOLOGICAL EXAM: Primary | ICD-10-CM

## 2023-10-27 PROCEDURE — 99395 PREV VISIT EST AGE 18-39: CPT | Performed by: PHYSICIAN ASSISTANT

## 2023-10-27 NOTE — PROGRESS NOTES
Assessment   33 y.o.  presenting for annual exam.     Plan   Diagnoses and all orders for this visit:    Well woman exam with routine gynecological exam        Pap up to date   Right breast tenderness - Patient's breast exam was unremarkable. Advised warm compresses, NSAIDs/tylenol and to monitor her symptoms over the next 2 weeks and to contact the office with persistent or worsening symptoms. Will consider breast ultrasound if symptoms do not resolve. Perineal hygiene reviewed. Weight bearing exercises minium of 150 mins/weekly advised. Kegel exercises recommended. SBE encouraged, A yearly mammogram is recommended for breast cancer screening starting at age 36. ASCCP guidelines reviewed. Condoms encouraged with all sexual activity to prevent STI's. Gardisil vaccines recommended up to age 39. Calcium/ Vit D dietary requirements discussed. Advised to call with any issues, all concerns & questions addressed. See provided information in your after visit summary     F/U Annually and PRN    Results will be released to [employer], if abnormal will call or message to review and discuss treatment plan.     __________________________________________________________________    Subjective     More Beck is a 33 y.o.  presenting for annual exam. She does not want STD testing today. Patient complaining of mild right breast tenderness under her nipple. She has noticed some increased sensitivity in the right breast over the past week. Denies masses, nipple discharge, lesions, or skin changes. Denies left breast tenderness. No recent trauma, changes in bra, or increased caffeine/chocolate intake.  Denies a history of breast issues in the past.     SCREENING  Last Pap: 10/14/2021, negative      GYN  LMP in 2023, does not get periods on Mirena IUD    Sexually active: Never  Contraception: Mirena IUD (replaced 2023)  Hx STI: denies     Hx Abnormal pap: denies  We reviewed ASCCP guidelines for Pap testing today.  Gardasil: She did completed the Gardasil series. Denies vaginal discharge, itching, odor, dyspareunia, pelvic pain and vulvar/vaginal symptoms      OB           Complaints: denies   Denies urgency, frequency, hematuria, leakage / change in stream, difficulty urinating. BREAST  Complaints: denies  Denies: breast lump, nipple discharge, skin color change, and skin lesion(s)  Personal hx: None    +mild right breast tenderness      Pertinent Family Hx:   Family hx of breast cancer: no  Family hx of ovarian cancer: no  Family hx of colon cancer: PGF, Patient reports her father has had some issues with his colon, but it is not cancer (unable to further clarify)       GENERAL  PMH reviewed/updated and is as below. Patient does follow with a PCP. SOCIAL  Smoking: No  Alcohol: No  Drug: No  Occupation: Medical device engineer      Past Medical History:   Diagnosis Date    Allergic 2014    Seasonal    Eating disorder [id number]    past, not current    Headache(784.0) 2011    hemiplegic migraines    Migraine     Migraine with aura     Visual impairment 2003    glasses       Past Surgical History:   Procedure Laterality Date    CYST REMOVAL      NO PAST SURGERIES      WISDOM TOOTH EXTRACTION           Current Outpatient Medications:     Lasmiditan Succinate (REYVOW) 50 MG tablet, Take 1 tablet (50 mg) one time as needed for migraine. Do not use more than one dose per day, or more than four doses per month, Disp: 8 tablet, Rfl: 3    levonorgestrel (MIRENA) 20 MCG/24HR IUD, 1 each by Intrauterine route once, Disp: , Rfl:     Magnesium 250 MG TABS, Take 1 tablet by mouth daily  , Disp: , Rfl:     verapamil (VERELAN PM) 120 MG 24 hr capsule, Take 1 capsule (120 mg total) by mouth daily at bedtime After 2 weeks, increase to 2 capsules by mouth daily at bedtime. , Disp: 180 capsule, Rfl: 0    Allergies   Allergen Reactions    Latex Hives       Social History     Socioeconomic History    Marital status: Single     Spouse name: Not on file    Number of children: Not on file    Years of education: college student    Highest education level: Not on file   Occupational History    Not on file   Tobacco Use    Smoking status: Never     Passive exposure: Never    Smokeless tobacco: Never   Vaping Use    Vaping Use: Never used   Substance and Sexual Activity    Alcohol use: Not Currently     Comment: Cannot drink due to interaction with verapamil    Drug use: Never    Sexual activity: Never     Birth control/protection: I.U.D. Comment: Mirena 1/11/23   Other Topics Concern    Not on file   Social History Narrative    Caffeine use    Lives with family     Social Determinants of Health     Financial Resource Strain: Not on file   Food Insecurity: Not on file   Transportation Needs: Not on file   Physical Activity: Not on file   Stress: Not on file   Social Connections: Not on file   Intimate Partner Violence: Not on file   Housing Stability: Not on file       Review of Systems     ROS:  Constitutional: Negative for fatigue and unexpected weight change. Respiratory: Negative for cough and shortness of breath. Cardiovascular: Negative for chest pain and palpitations. Gastrointestinal: Negative for abdominal pain and change in bowel habits  Breasts:  Negative, other than as noted above. Genitourinary: Negative, other than as noted above. Psychiatric: Negative for mood difficulties.       Objective      /62 (BP Location: Left arm, Patient Position: Sitting, Cuff Size: Standard)   Ht 5' 6" (1.676 m)   Wt 59 kg (130 lb)   LMP 02/01/2023 (Approximate)   BMI 20.98 kg/m²     Physical Examination:    Patient appears well and is not in distress  Neck is supple without masses, no cervical or supraclavicular lymphadenopathy  Cardiovascular: regular rate and rhythm; no murmurs  Lungs: clear to auscultation bilaterally; no wheezes  Breasts are symmetrical without mass, tenderness, nipple discharge, skin changes or adenopathy. Abdomen is soft and nontender without masses. External genitals are normal without lesions or rashes. Urethral meatus and urethra are normal  Bladder is normal to palpation  Vagina is normal without discharge or bleeding. Cervix is normal without discharge or lesion. +IUD strings  Uterus is normal, mobile, nontender without palpable mass. Adnexa are normal, nontender, without palpable mass.

## 2024-02-21 PROBLEM — Z01.419 ENCOUNTER FOR GYNECOLOGICAL EXAMINATION (GENERAL) (ROUTINE) WITHOUT ABNORMAL FINDINGS: Status: RESOLVED | Noted: 2020-09-29 | Resolved: 2024-02-21

## 2024-04-23 ENCOUNTER — OFFICE VISIT (OUTPATIENT)
Dept: URGENT CARE | Age: 29
End: 2024-04-23
Payer: COMMERCIAL

## 2024-04-23 VITALS
SYSTOLIC BLOOD PRESSURE: 114 MMHG | BODY MASS INDEX: 20.89 KG/M2 | DIASTOLIC BLOOD PRESSURE: 74 MMHG | WEIGHT: 130 LBS | TEMPERATURE: 97.6 F | RESPIRATION RATE: 16 BRPM | OXYGEN SATURATION: 98 % | HEART RATE: 79 BPM | HEIGHT: 66 IN

## 2024-04-23 DIAGNOSIS — J02.9 SORE THROAT: Primary | ICD-10-CM

## 2024-04-23 LAB — S PYO AG THROAT QL: NEGATIVE

## 2024-04-23 PROCEDURE — G0382 LEV 3 HOSP TYPE B ED VISIT: HCPCS

## 2024-04-23 PROCEDURE — 87880 STREP A ASSAY W/OPTIC: CPT

## 2024-04-23 PROCEDURE — 87070 CULTURE OTHR SPECIMN AEROBIC: CPT

## 2024-04-23 NOTE — PROGRESS NOTES
[facility]        NAME: Kezia Reardon is a 28 y.o. female  : 1995    MRN: [id number]  DATE: 2024  TIME: 6:02 PM    Assessment and Plan   Sore throat [J02.9]  1. Sore throat  POCT rapid strepA    Throat culture      Rapid strep performed in office found to be negative, throat culture results pending and should be available in MyChart within 48 hours.  May alternate Tylenol/ibuprofen as needed for fever.  May use Cepacol lozenges, Chloraseptic throat spray, warm salt water gargles and hot tea with honey as needed for sore throat.  Follow up with primary care provider if symptoms do not resolve within 1-2 weeks.        Patient Instructions   Pharyngitis   WHAT YOU NEED TO KNOW:   Pharyngitis, or sore throat, is inflammation of the tissues and structures in your pharynx (throat). Pharyngitis is most often caused by bacteria or a virus. Other causes include smoking, allergies, or acid reflux.  DISCHARGE INSTRUCTIONS:   Call your local emergency number (911 in the ) if:   You have trouble breathing or swallowing because your throat is swollen.        Return to the emergency department if:   You are drooling because it hurts too much to swallow.     Your fever is higher than 102?F (39?C) or lasts longer than 3 days.     You are confused.     You taste blood.     Call your doctor if:   Your throat pain gets worse.     You have a painful lump in your throat that does not go away after 5 days.     Your symptoms do not improve after 5 days.     You have questions or concerns about your condition or care.     Medicines:  Viral pharyngitis will go away on its own without treatment. Your sore throat should start to feel better in 3 to 5 days. You may need any of the following:  Antibiotics  treat a bacterial infection.     NSAIDs  help decrease swelling and pain or fever. This medicine is available with or without a doctor's order. NSAIDs can cause stomach bleeding or kidney problems in certain  people. If you take blood thinner medicine, always ask your healthcare provider if NSAIDs are safe for you. Always read the medicine label and follow directions.     Acetaminophen  decreases pain and fever. It is available without a doctor's order. Ask how much to take and how often to take it. Follow directions. Read the labels of all other medicines you are using to see if they also contain acetaminophen, or ask your doctor or pharmacist. Acetaminophen can cause liver damage if not taken correctly.     Take your medicine as directed.  Contact your healthcare provider if you think your medicine is not helping or if you have side effects. Tell your provider if you are allergic to any medicine. Keep a list of the medicines, vitamins, and herbs you take. Include the amounts, and when and why you take them. Bring the list or the pill bottles to follow-up visits. Carry your medicine list with you in case of an emergency.     Manage your symptoms:   Gargle salt water.  Mix ¼ teaspoon salt in an 8 ounce glass of warm water and gargle. Do not swallow. Salt water may help decrease swelling in your throat.     Drink liquids as directed.  You may need to drink more liquids than usual. Liquids may help soothe your throat and prevent dehydration. Ask how much liquid to drink each day and which liquids are best for you.     Use a cool mist humidifier.  This will add moisture to the air and help decrease your cough.     Soothe your throat.  Cough drops, ice, soft foods, or popsicles may help decrease throat pain.     Prevent the spread of pharyngitis:  Cover your mouth and nose when you cough or sneeze. Do not share food or drinks. Wash your hands often. Use soap and water. If soap and water are unavailable, use an alcohol-based hand .        Follow up with your doctor as directed:  Write down your questions so you remember to ask them during your visits.  © Copyright [employer] 2023 Information is for End User's use only  and may not be sold, redistributed or otherwise used for commercial purposes.  The above information is an  only. It is not intended as medical advice for individual conditions or treatments. Talk to your doctor, nurse or pharmacist before following any medical regimen to see if it is safe and effective for you.       Follow up with PCP in 3-5 days.  Proceed to  ER if symptoms worsen.    Chief Complaint     Chief Complaint   Patient presents with    Sore Throat     Per patient, her sore throat started on Saturday. Yesterday but not today,  she had dizziness. No exposure to strep and no home Covid testing done.          History of Present Illness       Sore Throat   This is a new problem. The current episode started in the past 7 days (x 3 days). The problem has been unchanged. Neither side of throat is experiencing more pain than the other. There has been no fever. Pertinent negatives include no abdominal pain, congestion, coughing, diarrhea, drooling, ear discharge, ear pain, headaches, hoarse voice, plugged ear sensation, neck pain, shortness of breath, stridor, swollen glands, trouble swallowing or vomiting. She has had no exposure to strep or mono. She has tried nothing for the symptoms. The treatment provided no relief.       Review of Systems   Review of Systems   Constitutional:  Negative for fatigue and fever.   HENT:  Positive for sore throat. Negative for congestion, drooling, ear discharge, ear pain, hoarse voice, postnasal drip, rhinorrhea, sinus pressure, sinus pain, sneezing and trouble swallowing.    Eyes: Negative.  Negative for pain, discharge, redness and itching.   Respiratory: Negative.  Negative for apnea, cough, choking, chest tightness, shortness of breath, wheezing and stridor.    Cardiovascular: Negative.  Negative for chest pain and palpitations.   Gastrointestinal: Negative.  Negative for abdominal pain, diarrhea, nausea and vomiting.   Endocrine: Negative.  Negative for  polydipsia, polyphagia and polyuria.   Genitourinary: Negative.  Negative for decreased urine volume and flank pain.   Musculoskeletal: Negative.  Negative for arthralgias, back pain, myalgias, neck pain and neck stiffness.   Skin: Negative.  Negative for color change and rash.   Allergic/Immunologic: Negative.  Negative for environmental allergies.   Neurological: Negative.  Negative for dizziness, facial asymmetry, light-headedness, numbness and headaches.   Hematological: Negative.  Negative for adenopathy.   Psychiatric/Behavioral: Negative.           Current Medications       Current Outpatient Medications:     Lasmiditan Succinate (REYVOW) 50 MG tablet, Take 1 tablet (50 mg) one time as needed for migraine. Do not use more than one dose per day, or more than four doses per month, Disp: 8 tablet, Rfl: 3    levonorgestrel (MIRENA) 20 MCG/24HR IUD, 1 each by Intrauterine route once, Disp: , Rfl:     Magnesium 250 MG TABS, Take 1 tablet by mouth daily   (Patient not taking: Reported on 4/23/2024), Disp: , Rfl:     verapamil (VERELAN PM) 120 MG 24 hr capsule, Take 1 capsule (120 mg total) by mouth daily at bedtime After 2 weeks, increase to 2 capsules by mouth daily at bedtime. (Patient not taking: Reported on 4/23/2024), Disp: 180 capsule, Rfl: 0    Current Allergies     Allergies as of 04/23/2024 - Reviewed 04/23/2024   Allergen Reaction Noted    Latex Hives 03/28/2018            The following portions of the patient's history were reviewed and updated as appropriate: allergies, current medications, past family history, past medical history, past social history, past surgical history and problem list.     Past Medical History:   Diagnosis Date    Allergic 2014    Seasonal    Eating disorder [id number]    past, not current    Headache(784.0) 2011    hemiplegic migraines    Migraine     Migraine with aura     Visual impairment 2003    glasses       Past Surgical History:   Procedure Laterality Date    CYST REMOVAL    "   NO PAST SURGERIES      WISDOM TOOTH EXTRACTION         Family History   Problem Relation Age of Onset    Migraines Mother     No Known Problems Father     Migraines Sister     Migraines Maternal Grandmother     Miscarriages / Stillbirths Maternal Grandmother     Osteoporosis Maternal Grandmother     Stroke Maternal Grandmother     Diabetes Maternal Grandfather     Arthritis Paternal Grandmother         hands, knees    Colon cancer Paternal Grandfather 68    Cancer Paternal Grandfather     Migraines Family     Mental illness Sister         Anxiety    Anxiety disorder Sister     Migraines Sister     Mental illness Sister         Anxiety    Anxiety disorder Sister     Migraines Sister     Breast cancer Neg Hx          Medications have been verified.        Objective   /74 (BP Location: Left arm, Patient Position: Sitting)   Pulse 79   Temp 97.6 °F (36.4 °C) (Skin)   Resp 16   Ht 5' 6\" (1.676 m)   Wt 59 kg (130 lb)   LMP 12/07/2023 (Exact Date) Comment: patient has an IUD  SpO2 98%   BMI 20.98 kg/m²        Physical Exam     Physical Exam  Vitals and nursing note reviewed.   Constitutional:       General: She is not in acute distress.     Appearance: Normal appearance. She is not ill-appearing, toxic-appearing or diaphoretic.      Interventions: She is not intubated.  HENT:      Head: Normocephalic and atraumatic.      Right Ear: Tympanic membrane and ear canal normal. No drainage, swelling or tenderness. No middle ear effusion. Tympanic membrane is not erythematous.      Left Ear: Tympanic membrane and ear canal normal. No drainage, swelling or tenderness.  No middle ear effusion. Tympanic membrane is not erythematous.      Nose: Nose normal. No congestion or rhinorrhea.      Mouth/Throat:      Mouth: Mucous membranes are moist. No oral lesions.      Tongue: No lesions. Tongue does not deviate from midline.      Palate: No mass and lesions.      Pharynx: Oropharynx is clear. Uvula midline. No pharyngeal " swelling, oropharyngeal exudate, posterior oropharyngeal erythema or uvula swelling.      Tonsils: No tonsillar exudate or tonsillar abscesses. 1+ on the right. 1+ on the left.   Eyes:      Extraocular Movements: Extraocular movements intact.      Conjunctiva/sclera: Conjunctivae normal.      Pupils: Pupils are equal, round, and reactive to light.   Neck:      Thyroid: No thyroid mass, thyromegaly or thyroid tenderness.   Cardiovascular:      Rate and Rhythm: Normal rate and regular rhythm.      Pulses: Normal pulses.      Heart sounds: Normal heart sounds, S1 normal and S2 normal. Heart sounds not distant. No murmur heard.     No friction rub. No gallop.   Pulmonary:      Effort: Pulmonary effort is normal. No tachypnea, bradypnea, accessory muscle usage, prolonged expiration, respiratory distress or retractions. She is not intubated.      Breath sounds: Normal breath sounds. No stridor, decreased air movement or transmitted upper airway sounds. No decreased breath sounds, wheezing, rhonchi or rales.   Abdominal:      General: Bowel sounds are normal.      Palpations: Abdomen is soft.      Tenderness: There is no abdominal tenderness. There is no guarding or rebound.   Musculoskeletal:         General: Normal range of motion.      Cervical back: Full passive range of motion without pain, normal range of motion and neck supple. No tenderness. No spinous process tenderness or muscular tenderness. Normal range of motion.   Lymphadenopathy:      Cervical: No cervical adenopathy.      Right cervical: No superficial cervical adenopathy.     Left cervical: No superficial cervical adenopathy.   Skin:     General: Skin is warm and dry.      Capillary Refill: Capillary refill takes less than 2 seconds.   Neurological:      General: No focal deficit present.      Mental Status: She is alert and oriented to person, place, and time.      Cranial Nerves: No cranial nerve deficit.   Psychiatric:         Mood and Affect: Mood  normal.         Behavior: Behavior normal.

## 2024-04-23 NOTE — PATIENT INSTRUCTIONS
Rapid strep performed in office found to be negative, throat culture results pending and should be available in [facility] within 48 hours.  May alternate Tylenol/ibuprofen as needed for fever.  May use Cepacol lozenges, Chloraseptic throat spray, warm salt water gargles and hot tea with honey as needed for sore throat.  Follow up with primary care provider if symptoms do not resolve within 1-2 weeks.

## 2024-04-25 LAB — BACTERIA THROAT CULT: NORMAL

## 2024-05-07 ENCOUNTER — TELEPHONE (OUTPATIENT)
Dept: FAMILY MEDICINE CLINIC | Facility: CLINIC | Age: 29
End: 2024-05-07

## 2024-06-04 ENCOUNTER — TELEPHONE (OUTPATIENT)
Age: 29
End: 2024-06-04

## 2024-06-04 NOTE — TELEPHONE ENCOUNTER
Patient had a bat in her home last night. Was caught and going to be tested for rabies. Was not bitten. Handled with gloves. Her cats did have contact with it. And the cats live inside. Contracted her vet . Was recommend to call her PCP by [employer]. Please advise

## 2024-06-06 ENCOUNTER — OFFICE VISIT (OUTPATIENT)
Dept: FAMILY MEDICINE CLINIC | Facility: CLINIC | Age: 29
End: 2024-06-06
Payer: COMMERCIAL

## 2024-06-06 VITALS
WEIGHT: 128 LBS | TEMPERATURE: 97.7 F | BODY MASS INDEX: 20.57 KG/M2 | HEART RATE: 76 BPM | DIASTOLIC BLOOD PRESSURE: 72 MMHG | OXYGEN SATURATION: 99 % | SYSTOLIC BLOOD PRESSURE: 112 MMHG | HEIGHT: 66 IN

## 2024-06-06 DIAGNOSIS — Z13.6 ENCOUNTER FOR SCREENING FOR CARDIOVASCULAR DISORDERS: ICD-10-CM

## 2024-06-06 DIAGNOSIS — Z11.59 ENCOUNTER FOR HEPATITIS C SCREENING TEST FOR LOW RISK PATIENT: ICD-10-CM

## 2024-06-06 DIAGNOSIS — F32.1 CURRENT MODERATE EPISODE OF MAJOR DEPRESSIVE DISORDER WITHOUT PRIOR EPISODE (HCC): Primary | ICD-10-CM

## 2024-06-06 PROCEDURE — 99214 OFFICE O/P EST MOD 30 MIN: CPT | Performed by: FAMILY MEDICINE

## 2024-06-06 NOTE — PROGRESS NOTES
Ambulatory Visit  Name: Kezia Reardon      : 1995      MRN: [id number]  Encounter Provider: Jacob Nowak MD  Encounter Date: 2024   Encounter department: [facility]    Assessment & Plan   1. Current moderate episode of major depressive disorder without prior episode (HCC)  Assessment & Plan:  New, exacerbated  Obtain labs to rule out organic etiology  Would start Prozac 10mg qd assuming labs negative  Discussed possible adverse effects of new medication and to call with any concerns.   - We discussed the benefits of counseling/therapy support  - Offered referrals as appropriate  - Counseled regarding lifestyle changes, sleep hygiene, breathing exercises, utilizing social supports, and CBT/breathing phone applications. Handout was provided on the same.  - ED precautions reviewed for suicidal ideation/plan  Orders:  -     CBC; Future; Expected date: 2024  -     Comprehensive metabolic panel; Future  -     TSH, 3rd generation with Free T4 reflex; Future  2. Encounter for hepatitis C screening test for low risk patient  -     Hepatitis C antibody; Future  3. Encounter for screening for cardiovascular disorders  -     Lipid Panel with Direct LDL reflex; Future     History of Present Illness     HPI She reports overall feeling fatigued. Gets 8 hrs sleep, does not feel rested on wakening. Sometimes gets woken back from her cats but can return to sleep quickly.  Struggling with focus, regardless of her environment. She's noticed she is stuttering at times. She'll sometimes mix two words together.   She used to get migraines monthly. They stopped now. She does get headaches weekly but not migraine. Across the forehead. No associated symptoms. Takes Advil which helps.   Gets menses q6 months approx due to mirena.     She has been more down lately, never had treatment before. She does report concomitant anxiety. Reports job stress but no major changes.    Goes to [facility]  weekly and walks.     PHQ-2/9 Depression Screening    Little interest or pleasure in doing things: 3 - nearly every day  Feeling down, depressed, or hopeless: 2 - more than half the days  Trouble falling or staying asleep, or sleeping too much: 1 - several days  Feeling tired or having little energy: 3 - nearly every day  Poor appetite or overeatin - more than half the days  Feeling bad about yourself - or that you are a failure or have let yourself or your family down: 3 - nearly every day  Trouble concentrating on things, such as reading the newspaper or watching television: 3 - nearly every day  Moving or speaking so slowly that other people could have noticed. Or the opposite - being so fidgety or restless that you have been moving around a lot more than usual: 1 - several days  Thoughts that you would be better off dead, or of hurting yourself in some way: 0 - not at all  PHQ-2 Score: 5  PHQ-2 Interpretation: POSITIVE depression screen  PHQ-9 Score: 18  PHQ-9 Interpretation: Moderately severe depression           Review of Systems   Constitutional:  Positive for fatigue. Negative for activity change, appetite change, fever and unexpected weight change.   HENT:  Negative for congestion and trouble swallowing.    Eyes:  Negative for visual disturbance.   Respiratory:  Negative for chest tightness and shortness of breath.    Cardiovascular:  Negative for palpitations.   Gastrointestinal:  Negative for diarrhea and nausea.   Genitourinary:  Negative for difficulty urinating.   Skin:  Negative for rash.   Neurological:  Positive for headaches. Negative for weakness and light-headedness.   Psychiatric/Behavioral:  Positive for decreased concentration and dysphoric mood. Negative for self-injury, sleep disturbance and suicidal ideas. The patient is nervous/anxious.    All other systems reviewed and are negative.    Medical History Reviewed by provider this encounter:  Tobacco  Allergies  Meds  Problems  Med Hx  " Surg Hx  Fam Hx       Objective     /72   Pulse 76   Temp 97.7 °F (36.5 °C)   Ht 5' 6\" (1.676 m)   Wt 58.1 kg (128 lb)   LMP  (LMP Unknown)   SpO2 99%   BMI 20.66 kg/m²     Physical Exam  Vitals and nursing note reviewed.   Constitutional:       General: She is not in acute distress.     Appearance: She is well-developed.   HENT:      Head: Normocephalic and atraumatic.      Right Ear: External ear normal.      Left Ear: External ear normal.      Nose: Nose normal.   Eyes:      Conjunctiva/sclera: Conjunctivae normal.   Neck:      Trachea: No tracheal deviation.   Cardiovascular:      Rate and Rhythm: Normal rate and regular rhythm.      Pulses: Normal pulses.      Heart sounds: Normal heart sounds. No murmur heard.     No gallop.   Pulmonary:      Effort: Pulmonary effort is normal.   Abdominal:      Tenderness: There is no abdominal tenderness.   Skin:     General: Skin is warm and dry.      Capillary Refill: Capillary refill takes less than 2 seconds.      Findings: No rash.   Neurological:      Mental Status: She is alert.      Cranial Nerves: No cranial nerve deficit.       Administrative Statements           "

## 2024-06-06 NOTE — ASSESSMENT & PLAN NOTE
New, exacerbated  Obtain labs to rule out organic etiology  Would start Prozac 10mg qd assuming labs negative  Discussed possible adverse effects of new medication and to call with any concerns.   - We discussed the benefits of counseling/therapy support  - Offered referrals as appropriate  - Counseled regarding lifestyle changes, sleep hygiene, breathing exercises, utilizing social supports, and CBT/breathing phone applications. Handout was provided on the same.  - ED precautions reviewed for suicidal ideation/plan

## 2024-06-06 NOTE — PATIENT INSTRUCTIONS
"Promotion of Mental Health  Dr. Jaime recommends the following for the promotion of mental health:    Counseling/therapy may be of help to you  [facility]  [facility]  [facility]  [facility]   If you are taking medication, you must take it as prescribed. Do not stop taking it or change doses without speaking to your doctor.  I encourage daily mindfulness habits including:  Go outside  Breathing exercises daily  Meditations or guided relaxation  Sleep hygiene (going to bed and waking up at the same time every day, even weekends)  Fueling your body and mind with water and nutritious food throughout the day  Let your friends and family know how you're feeling. Give them the opportunity to support you. Do not isolate yourself. Similarly, you may want to spend less time with people in your life who have bad habits you are trying to eliminate, or those who bring you down.  Be mindful of habits like smoking, drinking alcohol, and the use of other substances. I recommend a \"clean\" lifestyle to improve your mental health.  Use smart phone apps and YouTube to find meditations and breathing exercises:  Free apps I like: Insight Timer, Woebot, Breethe, Breathe+, MyLife Meditation --> note some of these apps have free and paid sections, so you may not be able to access all features.  Paid apps I like: Headspace, Breathing Zone, [place], Calm  I am not sponsored by nor do I receive money from these apps, and they are not officially recommended by [facility]. I recommend them because I use them or my patients use them with success.   If you ever feel like you may hurt yourself or someone else, please call 9-1-1, text [phone], or go to the nearest emergency department    I also recommend signing up for My Chart if you haven't already, which is the [facility], so that you can send me messages to ask questions through the My Chart portal. "     [email or web address]    National Suicide Prevention Hotline: 1-345.317.2094  If you or someone you know is suicidal or in emotional distress, contact the National Suicide Prevention Lifeline. Trained crisis workers are available to talk 24 hours a day, 7 days a week. Your confidential and toll-free call goes to the nearest crisis center in the Lifeline national network. These centers provide crisis counseling and mental health referrals. If you or someone you know is in immediate danger, please call 9-1-1.     [facility] Treatment Referral Helpline: 1-333.789.9149  Get general information on mental health and locate treatment services in your area. Speak to a live person, Monday through Friday from 8 a.m. to 8 p.m. EST.    Support Groups:  [facility] Behavioral Health Support Groups  Call intake to register: [phone]    National [place] on Mental Illness:  [address] [place] [address]  ([phone]  http://www.stefan-lv.org/  They provide multiple services including support groups for those with mental illness, as well as the family members of individuals with mental illness

## 2024-06-07 ENCOUNTER — APPOINTMENT (OUTPATIENT)
Dept: LAB | Facility: CLINIC | Age: 29
End: 2024-06-07
Payer: COMMERCIAL

## 2024-06-07 DIAGNOSIS — F32.1 CURRENT MODERATE EPISODE OF MAJOR DEPRESSIVE DISORDER WITHOUT PRIOR EPISODE (HCC): ICD-10-CM

## 2024-06-07 DIAGNOSIS — Z11.59 ENCOUNTER FOR HEPATITIS C SCREENING TEST FOR LOW RISK PATIENT: ICD-10-CM

## 2024-06-07 DIAGNOSIS — Z13.6 ENCOUNTER FOR SCREENING FOR CARDIOVASCULAR DISORDERS: ICD-10-CM

## 2024-06-07 LAB
ALBUMIN SERPL BCP-MCNC: 4.8 G/DL (ref 3.5–5)
ALP SERPL-CCNC: 57 U/L (ref 34–104)
ALT SERPL W P-5'-P-CCNC: 6 U/L (ref 7–52)
ANION GAP SERPL CALCULATED.3IONS-SCNC: 9 MMOL/L (ref 4–13)
AST SERPL W P-5'-P-CCNC: 11 U/L (ref 13–39)
BILIRUB SERPL-MCNC: 1.51 MG/DL (ref 0.2–1)
BUN SERPL-MCNC: 7 MG/DL (ref 5–25)
CALCIUM SERPL-MCNC: 9.5 MG/DL (ref 8.4–10.2)
CHLORIDE SERPL-SCNC: 108 MMOL/L (ref 96–108)
CHOLEST SERPL-MCNC: 178 MG/DL
CO2 SERPL-SCNC: 24 MMOL/L (ref 21–32)
CREAT SERPL-MCNC: 0.73 MG/DL (ref 0.6–1.3)
ERYTHROCYTE [DISTWIDTH] IN BLOOD BY AUTOMATED COUNT: 13 % (ref 11.6–15.1)
GFR SERPL CREATININE-BSD FRML MDRD: 113 ML/MIN/1.73SQ M
GLUCOSE P FAST SERPL-MCNC: 85 MG/DL (ref 65–99)
HCT VFR BLD AUTO: 41.3 % (ref 34.8–46.1)
HDLC SERPL-MCNC: 55 MG/DL
HGB BLD-MCNC: 13.6 G/DL (ref 11.5–15.4)
LDLC SERPL CALC-MCNC: 110 MG/DL (ref 0–100)
MCH RBC QN AUTO: 29.4 PG (ref 26.8–34.3)
MCHC RBC AUTO-ENTMCNC: 32.9 G/DL (ref 31.4–37.4)
MCV RBC AUTO: 89 FL (ref 82–98)
PLATELET # BLD AUTO: 259 THOUSANDS/UL (ref 149–390)
PMV BLD AUTO: 11.3 FL (ref 8.9–12.7)
POTASSIUM SERPL-SCNC: 3.8 MMOL/L (ref 3.5–5.3)
PROT SERPL-MCNC: 7.3 G/DL (ref 6.4–8.4)
RBC # BLD AUTO: 4.62 MILLION/UL (ref 3.81–5.12)
SODIUM SERPL-SCNC: 141 MMOL/L (ref 135–147)
TRIGL SERPL-MCNC: 64 MG/DL
TSH SERPL DL<=0.05 MIU/L-ACNC: 2.55 UIU/ML (ref 0.45–4.5)
WBC # BLD AUTO: 6.69 THOUSAND/UL (ref 4.31–10.16)

## 2024-06-07 PROCEDURE — 85027 COMPLETE CBC AUTOMATED: CPT

## 2024-06-07 PROCEDURE — 36415 COLL VENOUS BLD VENIPUNCTURE: CPT

## 2024-06-07 PROCEDURE — 86803 HEPATITIS C AB TEST: CPT

## 2024-06-07 PROCEDURE — 80061 LIPID PANEL: CPT

## 2024-06-07 PROCEDURE — 80053 COMPREHEN METABOLIC PANEL: CPT

## 2024-06-07 PROCEDURE — 84443 ASSAY THYROID STIM HORMONE: CPT

## 2024-06-08 LAB — HCV AB SER QL: NORMAL

## 2024-06-09 DIAGNOSIS — F32.1 CURRENT MODERATE EPISODE OF MAJOR DEPRESSIVE DISORDER WITHOUT PRIOR EPISODE (HCC): Primary | ICD-10-CM

## 2024-06-09 RX ORDER — FLUOXETINE 10 MG/1
10 TABLET, FILM COATED ORAL DAILY
Qty: 30 TABLET | Refills: 5 | Status: SHIPPED | OUTPATIENT
Start: 2024-06-09

## 2024-07-16 ENCOUNTER — OFFICE VISIT (OUTPATIENT)
Dept: FAMILY MEDICINE CLINIC | Facility: CLINIC | Age: 29
End: 2024-07-16
Payer: COMMERCIAL

## 2024-07-16 VITALS
BODY MASS INDEX: 20.49 KG/M2 | TEMPERATURE: 97.9 F | HEART RATE: 85 BPM | OXYGEN SATURATION: 98 % | HEIGHT: 66 IN | WEIGHT: 127.5 LBS | SYSTOLIC BLOOD PRESSURE: 110 MMHG | DIASTOLIC BLOOD PRESSURE: 64 MMHG

## 2024-07-16 DIAGNOSIS — F32.1 CURRENT MODERATE EPISODE OF MAJOR DEPRESSIVE DISORDER WITHOUT PRIOR EPISODE (HCC): ICD-10-CM

## 2024-07-16 PROCEDURE — 99213 OFFICE O/P EST LOW 20 MIN: CPT | Performed by: FAMILY MEDICINE

## 2024-07-16 RX ORDER — FLUOXETINE 10 MG/1
10 TABLET, FILM COATED ORAL DAILY
Qty: 90 TABLET | Refills: 1 | Status: SHIPPED | OUTPATIENT
Start: 2024-07-16

## 2024-07-16 NOTE — PROGRESS NOTES
Ambulatory Visit  Name: Kezia Reardon      : 1995      MRN: [id number]  Encounter Provider: Jacob Nowak MD  Encounter Date: 2024   Encounter department: [facility]    Assessment & Plan   1. Current moderate episode of major depressive disorder without prior episode (HCC)  -     FLUoxetine 10 MG tablet; Take 1 tablet (10 mg total) by mouth daily  Chronic, improving  Continue Prozac 10mg qd and encourage to establish with counseling  Return in 2 months      Depression Screening and Follow-up Plan: Patient's depression screening was positive with a PHQ-9 score of 10. Patient with underlying depression and was advised to continue current medications as prescribed.       History of Present Illness     HPI She overall does feel some improvement. She's noticed side effects of harder to fall asleep and sometimes waking up too early. 30 min -1 hr sleep latency. Also notes increased yawning during the day although she doesn't feel tired.     PHQ-2/9 Depression Screening    Little interest or pleasure in doing things: 1 - several days  Feeling down, depressed, or hopeless: 1 - several days  Trouble falling or staying asleep, or sleeping too much: 2 - more than half the days  Feeling tired or having little energy: 1 - several days  Poor appetite or overeatin - several days  Feeling bad about yourself - or that you are a failure or have let yourself or your family down: 1 - several days  Trouble concentrating on things, such as reading the newspaper or watching television: 3 - nearly every day  Moving or speaking so slowly that other people could have noticed. Or the opposite - being so fidgety or restless that you have been moving around a lot more than usual: 0 - not at all  Thoughts that you would be better off dead, or of hurting yourself in some way: 0 - not at all  PHQ-9 Score: 10  PHQ-9 Interpretation: Moderate depression       MORENITA-7 Flowsheet Screening   "  Flowsheet Row Most Recent Value   Over the last two weeks, how often have you been bothered by the following problems?     Feeling nervous, anxious, or on edge 1   Not being able to stop or control worrying 0   Worrying too much about different things 1   Trouble relaxing  0   Being so restless that it's hard to sit still 0   Becoming easily annoyed or irritable  0   Feeling afraid as if something awful might happen 0   How difficult have these problems made it for you to do your work, take care of things at home, or get along with other people?  Somewhat difficult   MORENITA Score  2            Review of Systems   Constitutional:  Positive for fatigue. Negative for activity change, appetite change, fever and unexpected weight change.   HENT:  Negative for congestion and trouble swallowing.    Eyes:  Negative for visual disturbance.   Respiratory:  Negative for chest tightness and shortness of breath.    Cardiovascular:  Negative for palpitations.   Gastrointestinal:  Negative for diarrhea and nausea.   Genitourinary:  Negative for difficulty urinating.   Skin:  Negative for rash.   Neurological:  Positive for headaches. Negative for weakness and light-headedness.   Psychiatric/Behavioral:  Positive for decreased concentration, dysphoric mood and sleep disturbance. Negative for self-injury and suicidal ideas. The patient is nervous/anxious.    All other systems reviewed and are negative.    Medical History Reviewed by provider this encounter:       Objective     /64   Pulse 85   Temp 97.9 °F (36.6 °C)   Ht 5' 6\" (1.676 m)   Wt 57.8 kg (127 lb 8 oz)   LMP 07/10/2024 (Exact Date)   SpO2 98%   BMI 20.58 kg/m²     Physical Exam  Vitals and nursing note reviewed.   Constitutional:       General: She is not in acute distress.     Appearance: She is well-developed.   HENT:      Head: Normocephalic and atraumatic.      Right Ear: External ear normal.      Left Ear: External ear normal.      Nose: Nose normal. "   Eyes:      Conjunctiva/sclera: Conjunctivae normal.   Neck:      Trachea: No tracheal deviation.   Pulmonary:      Effort: Pulmonary effort is normal.   Abdominal:      Tenderness: There is no abdominal tenderness.   Skin:     General: Skin is warm and dry.      Capillary Refill: Capillary refill takes less than 2 seconds.      Findings: No rash.   Neurological:      Mental Status: She is alert.      Cranial Nerves: No cranial nerve deficit.       Administrative Statements

## 2024-08-13 ENCOUNTER — OFFICE VISIT (OUTPATIENT)
Dept: FAMILY MEDICINE CLINIC | Facility: CLINIC | Age: 29
End: 2024-08-13
Payer: COMMERCIAL

## 2024-08-13 VITALS
WEIGHT: 128 LBS | DIASTOLIC BLOOD PRESSURE: 66 MMHG | OXYGEN SATURATION: 98 % | HEIGHT: 66 IN | TEMPERATURE: 97.7 F | SYSTOLIC BLOOD PRESSURE: 102 MMHG | HEART RATE: 73 BPM | BODY MASS INDEX: 20.57 KG/M2

## 2024-08-13 DIAGNOSIS — F32.4 MAJOR DEPRESSIVE DISORDER WITH SINGLE EPISODE, IN PARTIAL REMISSION (HCC): ICD-10-CM

## 2024-08-13 DIAGNOSIS — Z00.00 ANNUAL PHYSICAL EXAM: Primary | ICD-10-CM

## 2024-08-13 PROCEDURE — 99395 PREV VISIT EST AGE 18-39: CPT | Performed by: FAMILY MEDICINE

## 2024-08-13 NOTE — PATIENT INSTRUCTIONS
"Patient Education     Routine physical for adults   The Basics   Written by the doctors and editors at [facility]   What is a physical? -- A physical is a routine visit, or \"check-up,\" with your doctor. You might also hear it called a \"wellness visit\" or \"preventive visit.\"  During each visit, the doctor will:   Ask about your physical and mental health   Ask about your habits, behaviors, and lifestyle   Do an exam   Give you vaccines if needed   Talk to you about any medicines you take   Give advice about your health   Answer your questions  Getting regular check-ups is an important part of taking care of your health. It can help your doctor find and treat any problems you have. But it's also important for preventing health problems.  A routine physical is different from a \"sick visit.\" A sick visit is when you see a doctor because of a health concern or problem. Since physicals are scheduled ahead of time, you can think about what you want to ask the doctor.  How often should I get a physical? -- It depends on your age and health. In general, for people age 21 years and older:   If you are younger than 50 years, you might be able to get a physical every 3 years.   If you are 50 years or older, your doctor might recommend a physical every year.  If you have an ongoing health condition, like diabetes or high blood pressure, your doctor will probably want to see you more often.  What happens during a physical? -- In general, each visit will include:   Physical exam - The doctor or nurse will check your height, weight, heart rate, and blood pressure. They will also look at your eyes and ears. They will ask about how you are feeling and whether you have any symptoms that bother you.   Medicines - It's a good idea to bring a list of all the medicines you take to each doctor visit. Your doctor will talk to you about your medicines and answer any questions. Tell them if you are having any side effects that bother you. You " "should also tell them if you are having trouble paying for any of your medicines.   Habits and behaviors - This includes:   Your diet   Your exercise habits   Whether you smoke, drink alcohol, or use drugs   Whether you are sexually active   Whether you feel safe at home  Your doctor will talk to you about things you can do to improve your health and lower your risk of health problems. They will also offer help and support. For example, if you want to quit smoking, they can give you advice and might prescribe medicines. If you want to improve your diet or get more physical activity, they can help you with this, too.   Lab tests, if needed - The tests you get will depend on your age and situation. For example, your doctor might want to check your:   Cholesterol   Blood sugar   Iron level   Vaccines - The recommended vaccines will depend on your age, health, and what vaccines you already had. Vaccines are very important because they can prevent certain serious or deadly infections.   Discussion of screening - \"Screening\" means checking for diseases or other health problems before they cause symptoms. Your doctor can recommend screening based on your age, risk, and preferences. This might include tests to check for:   Cancer, such as breast, prostate, cervical, ovarian, colorectal, prostate, lung, or skin cancer   Sexually transmitted infections, such as chlamydia and gonorrhea   Mental health conditions like depression and anxiety  Your doctor will talk to you about the different types of screening tests. They can help you decide which screenings to have. They can also explain what the results might mean.   Answering questions - The physical is a good time to ask the doctor or nurse questions about your health. If needed, they can refer you to other doctors or specialists, too.  Adults older than 65 years often need other care, too. As you get older, your doctor will talk to you about:   How to prevent falling at " home   Hearing or vision tests   Memory testing   How to take your medicines safely   Making sure that you have the help and support you need at home  All topics are updated as new evidence becomes available and our peer review process is complete.  This topic retrieved from [employer] on: May 02, 2024.  Topic 256384 Version 1.0  Release: 32.4.3 - C32.122  © 2024 UpToDate, Inc. and/or its affiliates. All rights reserved.  Consumer Information Use and Disclaimer   Disclaimer: This generalized information is a limited summary of diagnosis, treatment, and/or medication information. It is not meant to be comprehensive and should be used as a tool to help the user understand and/or assess potential diagnostic and treatment options. It does NOT include all information about conditions, treatments, medications, side effects, or risks that may apply to a specific patient. It is not intended to be medical advice or a substitute for the medical advice, diagnosis, or treatment of a health care provider based on the health care provider's examination and assessment of a patient's specific and unique circumstances. Patients must speak with a health care provider for complete information about their health, medical questions, and treatment options, including any risks or benefits regarding use of medications. This information does not endorse any treatments or medications as safe, effective, or approved for treating a specific patient. UpToDate, Inc. and its affiliates disclaim any warranty or liability relating to this information or the use thereof.The use of this information is governed by the Terms of Use, available at https://www.[employer].com/en/know/clinical-effectiveness-terms. 2024© UpToDate, Inc. and its affiliates and/or licensors. All rights reserved.  Copyright   © 2024 UpToDate, Inc. and/or its affiliates. All rights reserved.

## 2024-08-13 NOTE — PROGRESS NOTES
"Adult Annual Physical  Name: Kezia Reardon      : 1995      MRN: [id number]  Encounter Provider: Jacob Nowak MD  Encounter Date: 2024   Encounter department: [facility]    Assessment & Plan   1. Annual physical exam  2. Major depressive disorder with single episode, in partial remission (HCC)  Assessment & Plan:  Improving  Continue fluoxetine 10mg qd   F/u 4 months     Recommend trial of NSAID/ice to area of pain  If it persists would refer to shoulder orthopedist    Immunizations and preventive care screenings were discussed with patient today. Appropriate education was printed on patient's after visit summary.    Counseling:  Dental Health: discussed importance of regular tooth brushing, flossing, and dental visits.  Exercise: the importance of regular exercise/physical activity was discussed. Recommend exercise 3-5 times per week for at least 30 minutes.       Depression Screening and Follow-up Plan: Patient's depression screening was positive with a PHQ-9 score of 7. Patient with underlying depression and was advised to continue current medications as prescribed.         History of Present Illness     Adult Annual Physical:  Patient presents for annual physical. She noted a \"dent\" in her collarbone on the left side. It aches sometimes during running and swim but not rafaela. First noticed a few years ago. Even as a child it did feel sore there. Hasn't tried anything like heat, ice, NSAIDs. .     Diet and Physical Activity:  - Diet/Nutrition: well balanced diet.  - Exercise: moderate cardiovascular exercise and 1-2 times a week on average.    Depression Screening:    - PHQ-9 Score: 7    General Health:  - Sleep: sleeps poorly. she struggles to get up in the morning. can lay around for hours. never falls asleep unintetionally.  - Hearing: normal hearing bilateral ears.  - Vision: goes for regular eye exams and wears glasses.  - Dental: regular dental " "visits.    Review of Systems   Constitutional:  Negative for chills and fever.   HENT:  Negative for congestion and sore throat.    Eyes:  Negative for pain and visual disturbance.   Respiratory:  Negative for cough and shortness of breath.    Cardiovascular:  Negative for chest pain and palpitations.   Gastrointestinal:  Negative for abdominal pain and nausea.   Genitourinary:  Negative for dysuria.   Musculoskeletal:  Negative for arthralgias and myalgias.   Skin:  Negative for rash and wound.   Neurological:  Negative for dizziness and headaches.   Psychiatric/Behavioral:  Positive for sleep disturbance.    All other systems reviewed and are negative.    Medical History Reviewed by provider this encounter:  Tobacco  Allergies  Meds  Problems  Med Hx  Surg Hx  Fam Hx         Objective     /66   Pulse 73   Temp 97.7 °F (36.5 °C)   Ht 5' 6\" (1.676 m)   Wt 58.1 kg (128 lb)   LMP 08/05/2024 (Exact Date)   SpO2 98%   BMI 20.66 kg/m²     Physical Exam  Vitals and nursing note reviewed.   Constitutional:       General: She is not in acute distress.     Appearance: She is well-developed. She is not ill-appearing.   HENT:      Head: Normocephalic and atraumatic.      Right Ear: Tympanic membrane, ear canal and external ear normal. No middle ear effusion.      Left Ear: Tympanic membrane, ear canal and external ear normal.  No middle ear effusion.      Nose: Nose normal. No congestion or rhinorrhea.      Mouth/Throat:      Lips: Pink.      Mouth: Mucous membranes are moist.      Pharynx: Oropharynx is clear. Uvula midline. No oropharyngeal exudate.      Tonsils: No tonsillar exudate.   Eyes:      General: Lids are normal.      Extraocular Movements: Extraocular movements intact.      Conjunctiva/sclera: Conjunctivae normal.      Pupils: Pupils are equal, round, and reactive to light.   Neck:      Thyroid: No thyromegaly.      Trachea: No tracheal deviation.   Cardiovascular:      Rate and Rhythm: Normal " rate and regular rhythm.      Pulses: Normal pulses.      Heart sounds: Normal heart sounds, S1 normal and S2 normal. No murmur heard.  Pulmonary:      Effort: Pulmonary effort is normal. No respiratory distress.      Breath sounds: Normal breath sounds. No decreased breath sounds, wheezing, rhonchi or rales.   Chest:          Comments: Bone deformity in area indicated to left clavicle   Abdominal:      General: Bowel sounds are normal. There is no distension.      Palpations: Abdomen is soft.      Tenderness: There is no abdominal tenderness.   Musculoskeletal:      Right lower leg: No edema.      Left lower leg: No edema.   Lymphadenopathy:      Cervical: No cervical adenopathy.   Skin:     General: Skin is warm and dry.      Capillary Refill: Capillary refill takes less than 2 seconds.   Neurological:      Mental Status: She is alert and oriented to person, place, and time.      Deep Tendon Reflexes: Reflexes normal.      Reflex Scores:       Patellar reflexes are 2+ on the right side and 2+ on the left side.  Psychiatric:         Attention and Perception: Attention normal.         Mood and Affect: Mood normal.         Thought Content: Thought content does not include suicidal ideation.

## 2024-09-16 ENCOUNTER — OFFICE VISIT (OUTPATIENT)
Dept: FAMILY MEDICINE CLINIC | Facility: CLINIC | Age: 29
End: 2024-09-16
Payer: COMMERCIAL

## 2024-09-16 VITALS
BODY MASS INDEX: 21.29 KG/M2 | HEART RATE: 63 BPM | WEIGHT: 132.5 LBS | SYSTOLIC BLOOD PRESSURE: 116 MMHG | HEIGHT: 66 IN | OXYGEN SATURATION: 98 % | TEMPERATURE: 97.8 F | DIASTOLIC BLOOD PRESSURE: 74 MMHG

## 2024-09-16 DIAGNOSIS — Z23 ENCOUNTER FOR IMMUNIZATION: ICD-10-CM

## 2024-09-16 DIAGNOSIS — F32.1 CURRENT MODERATE EPISODE OF MAJOR DEPRESSIVE DISORDER WITHOUT PRIOR EPISODE (HCC): ICD-10-CM

## 2024-09-16 DIAGNOSIS — F32.5 MAJOR DEPRESSIVE DISORDER WITH SINGLE EPISODE, IN FULL REMISSION (HCC): Primary | ICD-10-CM

## 2024-09-16 PROCEDURE — 90471 IMMUNIZATION ADMIN: CPT | Performed by: FAMILY MEDICINE

## 2024-09-16 PROCEDURE — 99213 OFFICE O/P EST LOW 20 MIN: CPT | Performed by: FAMILY MEDICINE

## 2024-09-16 PROCEDURE — 90656 IIV3 VACC NO PRSV 0.5 ML IM: CPT | Performed by: FAMILY MEDICINE

## 2024-09-16 RX ORDER — FLUOXETINE 10 MG/1
10 TABLET, FILM COATED ORAL DAILY
Qty: 90 TABLET | Refills: 1 | Status: SHIPPED | OUTPATIENT
Start: 2024-09-16

## 2024-09-16 NOTE — PATIENT INSTRUCTIONS
"Patient Education    EPLEY MANEUVER      Vertigo (a type of dizziness)   The Basics   Written by the doctors and editors at [facility]   What are dizziness and vertigo? -- Dizziness is a feeling that is sometimes hard to describe. It often makes you feel like you are about to fall or pass out. Dizziness can also cause you to feel lightheaded or make it hard for you to walk straight.  Vertigo is a type of dizziness. It makes you feel like you are spinning, swaying, or tilting, or like the room is moving around you. Depending on the cause, these feelings can come and go, and might last seconds, hours, or days. You might feel worse when you move your head, change positions, cough, or sneeze.  Some people with vertigo have trouble walking. Others have nausea and might vomit.  What causes vertigo? -- The most common causes of vertigo include:   Inner ear problems - Deep inside the ear, there is a small network of tubes that are filled with fluid (figure 1). Floating inside that fluid are special calcium deposits. These tubes and deposits are part of the \"vestibular system.\" This system tells the brain what position the body is in and how, and if it is moving or still. It also helps keep you balanced.  Problems that affect the inner ear and can lead to vertigo include:   Benign paroxysmal positional vertigo (\"BPPV\") - In this condition, calcium deposits become dislodged from their location in the inner ear. This can lead to short episodes of vertigo that happen when you move your head in certain ways.   Meniere disease - This is a condition in which fluid builds up inside the inner ear. This causes vertigo, as well as hearing loss and ringing in 1 or both ears.   Vestibular neuritis - This is believed to be caused by a virus that can cause inflammation of the nerve in the inner ear. It is sometimes called \"labyrinthitis.\" People with this condition have vertigo that starts quickly and can last several days. They also often " feel very sick and off balance.   Head injury - Even a minor head injury can cause inner ear damage and vertigo. This is usually temporary.   Other problems - Other things that can cause vertigo include:   Vestibular migraine - People who get migraine headaches can sometimes have episodes of vertigo. This can happen with or without a headache.   Certain medicines   Problems that affect the brain, such as stroke or multiple sclerosis  Should I see a doctor or nurse? -- See your doctor or nurse right away if you have vertigo and:   Have a new or severe headache   Have a fever higher than 100.4°F (38°C)   Start to see double, or have trouble seeing clearly   Have trouble speaking or hearing   Have weakness in an arm or leg, or your face droops to 1 side   Cannot walk on your own   Pass out   Have numbness or tingling   Have chest pain   Cannot stop vomiting  You should also see your doctor or nurse if you have vertigo that lasts for several minutes or more and you:   Are older than 60   Had a stroke in the past   Are at risk for having a stroke, for example, because you have diabetes or you smoke  If you have dizziness or vertigo that comes and goes but you do not have any of the problems listed above, you should still make an appointment with your doctor or nurse.  Will I need tests? -- Maybe. Your doctor will start by learning about your symptoms and doing an exam. During the exam, they will check:   Your hearing   How you walk and keep your balance   How your eyes work when you watch a moving object, and when your head is turned from side to side  Depending on what your doctor finds during the exam, they might order more tests to better understand your hearing or balance problems. In some cases, the doctor will order an MRI of your brain. An MRI is an imaging test that creates pictures of the inside of your body.  How is vertigo treated? -- If your doctor knows what is causing your vertigo, they will probably try to  "treat that problem directly. For instance, if you have BPPV, the doctor might try moving your head in a specific way. This can move the calcium deposits that are causing your vertigo.  Your doctor can also give you medicines that might help your vertigo and relieve nausea and vomiting.  If your vertigo does not get better, your doctor might also suggest a treatment called \"balance rehabilitation.\" This treatment teaches you exercises that can help you cope with your vertigo.  Is there anything I can do on my own? -- Yes. You can:   Prevent falls - If you have trouble standing or walking because of vertigo, you are at risk of falling. To lower this risk, make your home as safe as possible. Get rid of loose electrical cords, clutter, and slippery rugs. Also, wear sturdy, non-slip shoes, and make sure that your walkways are clear and well lit.   Sit or lie down if you start to feel dizzy. If you start to feel dizzy while driving, pull over right away.   Use a cane or walker to help you balance if needed.   Try to avoid changing positions quickly. When you wake up, sit up first, then get out of bed slowly.  All topics are updated as new evidence becomes available and our peer review process is complete.  This topic retrieved from [email or web address] on: Mar 15, 2024.  Topic 75554 Version 8.0  Release: 32.2.4 - C32.73  © 2024 UpToDate, Inc. and/or its affiliates. All rights reserved.  figure 1: The vestibular system     Deep inside the ear, there is a small network of tubes that are filled with fluid, called the \"semicircular canals.\" Also deep inside the ear are special calcium deposits, called \"otolith organs.\" These tubes and deposits are part of the \"vestibular system.\" This system tells the brain what position the head is in and if it is moving or still. It also helps keep you balanced, especially when you are standing or walking.  Graphic 09062 Version 11.0  Consumer Information Use and Disclaimer   Disclaimer: This " generalized information is a limited summary of diagnosis, treatment, and/or medication information. It is not meant to be comprehensive and should be used as a tool to help the user understand and/or assess potential diagnostic and treatment options. It does NOT include all information about conditions, treatments, medications, side effects, or risks that may apply to a specific patient. It is not intended to be medical advice or a substitute for the medical advice, diagnosis, or treatment of a health care provider based on the health care provider's examination and assessment of a patient's specific and unique circumstances. Patients must speak with a health care provider for complete information about their health, medical questions, and treatment options, including any risks or benefits regarding use of medications. This information does not endorse any treatments or medications as safe, effective, or approved for treating a specific patient. UpToDate, Inc. and its affiliates disclaim any warranty or liability relating to this information or the use thereof.The use of this information is governed by the Terms of Use, available at https://www.wolterskluwer.com/en/know/clinical-effectiveness-terms. 2024© UpToDate, Inc. and its affiliates and/or licensors. All rights reserved.  Copyright   © 2024 Up[employer]Date, Inc. and/or its affiliates. All rights reserved.

## 2024-09-16 NOTE — PROGRESS NOTES
Ambulatory Visit  Name: Kezia Reardon      : 1995      MRN: [id number]  Encounter Provider: Jacob Nowak MD  Encounter Date: 2024   Encounter department: [facility]    Assessment & Plan  Major depressive disorder with single episode, in full remission (HCC)  Chronic, stable  Continue fluoxetine 10mg qd   F/u 6 months          Encounter for immunization    Orders:    influenza vaccine preservative-free 0.5 mL IM (Fluzone, Afluria, Fluarix, Flulaval)       History of Present Illness     HPI she's had 2 episodes of dizziness like she felt she may faint. Both episodes came on suddenly and resolved on their own within 1 minute. Room spinning sensation the 3rd time. First time standing, 2nd was sitting, 3rd in bed.     She reports her mood continues to do well. No side effects from medication. Compliant with medication.     PHQ-2/9 Depression Screening    Little interest or pleasure in doing things: 1 - several days  Feeling down, depressed, or hopeless: 0 - not at all  Trouble falling or staying asleep, or sleeping too much: 0 - not at all  Feeling tired or having little energy: 1 - several days  Poor appetite or overeatin - not at all  Feeling bad about yourself - or that you are a failure or have let yourself or your family down: 0 - not at all  Trouble concentrating on things, such as reading the newspaper or watching television: 2 - more than half the days  Moving or speaking so slowly that other people could have noticed. Or the opposite - being so fidgety or restless that you have been moving around a lot more than usual: 0 - not at all  Thoughts that you would be better off dead, or of hurting yourself in some way: 0 - not at all  PHQ-9 Score: 4  PHQ-9 Interpretation: No or Minimal depression         History obtained from : patient  Review of Systems   Constitutional:  Negative for chills and fever.   HENT:  Negative for congestion and sore throat.    Eyes:  " Negative for pain and visual disturbance.   Respiratory:  Negative for cough and shortness of breath.    Cardiovascular:  Negative for chest pain and palpitations.   Gastrointestinal:  Negative for abdominal pain and nausea.   Genitourinary:  Negative for dysuria.   Musculoskeletal:  Negative for arthralgias and myalgias.   Skin:  Negative for rash and wound.   Neurological:  Positive for dizziness. Negative for headaches.   All other systems reviewed and are negative.    Medical History Reviewed by provider this encounter:           Objective     /74   Pulse 63   Temp 97.8 °F (36.6 °C)   Ht 5' 6\" (1.676 m)   Wt 60.1 kg (132 lb 8 oz)   SpO2 98%   BMI 21.39 kg/m²     Physical Exam  Vitals and nursing note reviewed.   Constitutional:       General: She is not in acute distress.     Appearance: She is well-developed.   HENT:      Head: Normocephalic and atraumatic.      Right Ear: Tympanic membrane, ear canal and external ear normal.      Left Ear: Tympanic membrane, ear canal and external ear normal.      Ears:      Comments: Serous effusion bilaterally     Nose: Nose normal.   Eyes:      Conjunctiva/sclera: Conjunctivae normal.   Neck:      Trachea: No tracheal deviation.   Pulmonary:      Effort: Pulmonary effort is normal.   Abdominal:      Tenderness: There is no abdominal tenderness.   Skin:     General: Skin is warm and dry.      Capillary Refill: Capillary refill takes less than 2 seconds.      Findings: No rash.   Neurological:      Mental Status: She is alert.      Cranial Nerves: No cranial nerve deficit.         "

## 2024-10-29 ENCOUNTER — ANNUAL EXAM (OUTPATIENT)
Dept: OBGYN CLINIC | Facility: CLINIC | Age: 29
End: 2024-10-29
Payer: COMMERCIAL

## 2024-10-29 VITALS — DIASTOLIC BLOOD PRESSURE: 72 MMHG | SYSTOLIC BLOOD PRESSURE: 100 MMHG | WEIGHT: 135 LBS | BODY MASS INDEX: 21.79 KG/M2

## 2024-10-29 DIAGNOSIS — Z01.419 WELL WOMAN EXAM WITH ROUTINE GYNECOLOGICAL EXAM: Primary | ICD-10-CM

## 2024-10-29 DIAGNOSIS — Z12.4 SCREENING FOR CERVICAL CANCER: ICD-10-CM

## 2024-10-29 PROCEDURE — 99395 PREV VISIT EST AGE 18-39: CPT | Performed by: PHYSICIAN ASSISTANT

## 2024-10-29 PROCEDURE — G0145 SCR C/V CYTO,THINLAYER,RESCR: HCPCS | Performed by: PHYSICIAN ASSISTANT

## 2024-11-01 LAB
LAB AP GYN PRIMARY INTERPRETATION: NORMAL
Lab: NORMAL

## 2025-06-28 ENCOUNTER — OFFICE VISIT (OUTPATIENT)
Dept: URGENT CARE | Age: 30
End: 2025-06-28
Payer: COMMERCIAL

## 2025-06-28 VITALS
SYSTOLIC BLOOD PRESSURE: 128 MMHG | HEART RATE: 86 BPM | TEMPERATURE: 97.4 F | OXYGEN SATURATION: 99 % | RESPIRATION RATE: 18 BRPM | DIASTOLIC BLOOD PRESSURE: 64 MMHG

## 2025-06-28 DIAGNOSIS — T63.441A BEE STING, ACCIDENTAL OR UNINTENTIONAL, INITIAL ENCOUNTER: Primary | ICD-10-CM

## 2025-06-28 PROCEDURE — G0382 LEV 3 HOSP TYPE B ED VISIT: HCPCS | Performed by: PHYSICIAN ASSISTANT

## 2025-06-28 RX ORDER — CEPHALEXIN 500 MG/1
500 CAPSULE ORAL EVERY 8 HOURS SCHEDULED
Qty: 21 CAPSULE | Refills: 0 | Status: SHIPPED | OUTPATIENT
Start: 2025-06-28 | End: 2025-07-05

## 2025-06-28 RX ORDER — PREDNISONE 20 MG/1
40 TABLET ORAL DAILY
Qty: 10 TABLET | Refills: 0 | Status: SHIPPED | OUTPATIENT
Start: 2025-06-28 | End: 2025-07-03

## 2025-06-28 NOTE — PROGRESS NOTES
[facility]        NAME: Kezia Reardon is a 29 y.o. female  : 1995    MRN: [id number]  DATE: 2025  TIME: 9:16 AM      Assessment and Plan     Bee sting, accidental or unintentional, initial encounter [T63.441A]  1. Bee sting, accidental or unintentional, initial encounter  predniSONE 20 mg tablet    cephalexin (KEFLEX) 500 mg capsule        Due to patchy nature of erythema at this time suspect allergic reaction to stings and will trial prednisone as initial course of treatment.     POC Testing Results        Note:       Patient Instructions     Patient Instructions   May continue to apply topical Benadryl cream as needed for itching    Prednisone in the morning with food    If your symptoms persist or worsen please fill and take the antibiotic prescribed. Please apply warm compresses for 15 minutes three times daily. If symptoms continue to worsen please return to our clinic.       Follow up with primary care provider.   Go to ER if symptoms worsen.    Chief Complaint     Chief Complaint   Patient presents with    Insect Bite     Bee sting on right leg one week ago. Redness is spreading with itching.         History of Present Illness     Pt reports 1 week ago she felt something fly up her pant leg and sting her twice to the right lower leg and then fly away. She reports increasing in redness and itching. Redness has been patchy but becoming more diffuse. Pt denies fever or discharge from sting areas. She denies any tongue/lip swelling or SOB.        Review of Systems     Review of Systems   Constitutional:  Negative for chills and fever.   HENT:  Negative for facial swelling.    Eyes:  Negative for redness.   Respiratory:  Negative for shortness of breath.    Gastrointestinal:  Negative for vomiting.   Musculoskeletal:  Negative for myalgias.   Skin:  Positive for color change and wound.   Neurological:  Negative for syncope.         Current Medications     Current  Medications[1]    Current Allergies     Allergies as of 06/28/2025 - Reviewed 06/28/2025   Allergen Reaction Noted    Latex Hives 03/28/2018              The following portions of the patient's history were reviewed and updated as appropriate: allergies, current medications, past family history, past medical history, past social history, past surgical history, and problem list.     Past Medical History[2]    Past Surgical History[3]    Family History[4]      Medications have been verified.        Objective     /64   Pulse 86   Temp (!) 97.4 °F (36.3 °C)   Resp 18   SpO2 99%   No LMP recorded. Patient has had an implant.         Physical Exam     Physical Exam  Vitals and nursing note reviewed.   Constitutional:       General: She is not in acute distress.     Appearance: Normal appearance. She is not ill-appearing, toxic-appearing or diaphoretic.   HENT:      Head: Normocephalic and atraumatic.      Mouth/Throat:      Lips: Pink.      Mouth: Mucous membranes are moist.      Pharynx: Oropharynx is clear. No pharyngeal swelling, oropharyngeal exudate, posterior oropharyngeal erythema or uvula swelling.      Tonsils: No tonsillar exudate or tonsillar abscesses.     Eyes:      Conjunctiva/sclera: Conjunctivae normal.       Cardiovascular:      Rate and Rhythm: Normal rate and regular rhythm.      Heart sounds: Normal heart sounds.   Pulmonary:      Effort: Pulmonary effort is normal.      Breath sounds: Normal breath sounds.     Musculoskeletal:      Right lower leg: Swelling present. No lacerations, tenderness or bony tenderness. No edema.     Skin:     Findings: Erythema present. No abscess, bruising or ecchymosis.      Comments: Ovoid patchy area of erythema with mild swelling noted to lateral right lower leg. Approx 3 cm x 5 cm. Nontender, blanches and no discharge or warmth noted.     Neurological:      General: No focal deficit present.      Mental Status: She is alert and oriented to person, place, and  time. Mental status is at baseline.     Psychiatric:         Mood and Affect: Mood normal.         Behavior: Behavior normal.         Thought Content: Thought content normal.         Judgment: Judgment normal.              [1]   Current Outpatient Medications:     cephalexin (KEFLEX) 500 mg capsule, Take 1 capsule (500 mg total) by mouth every 8 (eight) hours for 7 days, Disp: 21 capsule, Rfl: 0    levonorgestrel (MIRENA) 20 MCG/24HR IUD, 1 each by Intrauterine route once, Disp: , Rfl:     predniSONE 20 mg tablet, Take 2 tablets (40 mg total) by mouth daily for 5 days, Disp: 10 tablet, Rfl: 0    FLUoxetine 10 MG tablet, Take 1 tablet (10 mg total) by mouth daily (Patient not taking: Reported on 6/28/2025), Disp: 90 tablet, Rfl: 1    Lasmiditan Succinate (REYVOW) 50 MG tablet, Take 1 tablet (50 mg) one time as needed for migraine. Do not use more than one dose per day, or more than four doses per month (Patient not taking: Reported on 6/28/2025), Disp: 8 tablet, Rfl: 3  [2]   Past Medical History:  Diagnosis Date    Allergic 2014    Seasonal    Depression 7/2024    Eating disorder [id number]    past, not current    Headache(784.0) 2011    hemiplegic migraines    Migraine     Migraine with aura     Visual impairment 2003    glasses   [3]   Past Surgical History:  Procedure Laterality Date    CYST REMOVAL      NO PAST SURGERIES      WISDOM TOOTH EXTRACTION     [4]   Family History  Problem Relation Name Age of Onset    Migraines Mother Hiwot     No Known Problems Father      Migraines Sister      Mental illness Sister Negin         Anxiety    Anxiety disorder Sister Negin     Migraines Sister Negin     Mental illness Sister Mallory         Anxiety    Anxiety disorder Sister Mallory     Migraines Sister Mallory     Migraines Maternal Grandmother Marissanne     Miscarriages / Stillbirths Maternal Grandmother Marissanne     Osteoporosis Maternal Grandmother Marissanne     Stroke Maternal Grandmother Marissannmarcial     Diabetes  Maternal Grandfather Tunde     Arthritis Paternal Grandmother Misty         hands, knees    Colon cancer Paternal Grandfather Harjit 68    Cancer Paternal Grandfather Harjit     Migraines Family      Breast cancer Neg Hx      Cervical cancer Neg Hx      Ovarian cancer Neg Hx      Uterine cancer Neg Hx      Endometrial cancer Neg Hx

## 2025-06-28 NOTE — PATIENT INSTRUCTIONS
May continue to apply topical Benadryl cream as needed for itching    Prednisone in the morning with food    If your symptoms persist or worsen please fill and take the antibiotic prescribed. Please apply warm compresses for 15 minutes three times daily. If symptoms continue to worsen please return to our clinic.

## 2025-07-07 ENCOUNTER — TELEPHONE (OUTPATIENT)
Age: 30
End: 2025-07-07

## 2025-07-08 ENCOUNTER — OFFICE VISIT (OUTPATIENT)
Dept: NEUROLOGY | Facility: CLINIC | Age: 30
End: 2025-07-08
Payer: COMMERCIAL

## 2025-07-08 VITALS
BODY MASS INDEX: 22.34 KG/M2 | HEART RATE: 72 BPM | HEIGHT: 66 IN | WEIGHT: 139 LBS | SYSTOLIC BLOOD PRESSURE: 122 MMHG | DIASTOLIC BLOOD PRESSURE: 92 MMHG

## 2025-07-08 DIAGNOSIS — G43.409 HEMIPLEGIC MIGRAINE WITHOUT STATUS MIGRAINOSUS, NOT INTRACTABLE: Primary | ICD-10-CM

## 2025-07-08 PROCEDURE — 99214 OFFICE O/P EST MOD 30 MIN: CPT | Performed by: STUDENT IN AN ORGANIZED HEALTH CARE EDUCATION/TRAINING PROGRAM

## 2025-07-08 RX ORDER — ONDANSETRON 4 MG/1
4 TABLET, ORALLY DISINTEGRATING ORAL EVERY 6 HOURS PRN
Qty: 30 TABLET | Refills: 1 | Status: SHIPPED | OUTPATIENT
Start: 2025-07-08

## 2025-07-08 NOTE — ASSESSMENT & PLAN NOTE
Preventative:  - we discussed headache hygiene and lifestyle factors that may improve headaches, including sleep hygiene, hydration goal 64-80 oz water daily, limiting caffeine intake, and not skipping meals  - previously d/c verapamil. At this time, given infrequent migraines, would defer preventative management. Advised her to continue keeping a migraine diary to help guide further management     Abortive:  - discussed not taking over-the-counter or prescription pain medications more than 3 days per week to prevent medication overuse/rebound headache  - previously failed sumatriptan. Further triptans contraindicated in diagnosis of hemiplegic migraine. Start nurtec 75 mg PRN at onset of migraine. Zofran 4 mg PRN for nausea/vomiting associated with migraine.     Orders:    rimegepant sulfate (NURTEC) 75 mg TBDP; Take 1 tablet (75 mg total) by mouth daily as needed (at onset of migraine)    ondansetron (ZOFRAN-ODT) 4 mg disintegrating tablet; Take 1 tablet (4 mg total) by mouth every 6 (six) hours as needed for nausea or vomiting

## 2025-07-08 NOTE — PROGRESS NOTES
Name: Kezia Reardon      : 1995      MRN: [id number]  Encounter Provider: Kate Vieyra MD  Encounter Date: 2025   Encounter department: [facility] [place]  :  Assessment & Plan  Hemiplegic migraine without status migrainosus, not intractable  Preventative:  - we discussed headache hygiene and lifestyle factors that may improve headaches, including sleep hygiene, hydration goal 64-80 oz water daily, limiting caffeine intake, and not skipping meals  - previously d/c verapamil. At this time, given infrequent migraines, would defer preventative management. Advised her to continue keeping a migraine diary to help guide further management     Abortive:  - discussed not taking over-the-counter or prescription pain medications more than 3 days per week to prevent medication overuse/rebound headache  - previously failed sumatriptan. Further triptans contraindicated in diagnosis of hemiplegic migraine. Start nurtec 75 mg PRN at onset of migraine. Zofran 4 mg PRN for nausea/vomiting associated with migraine.     Orders:    rimegepant sulfate (NURTEC) 75 mg TBDP; Take 1 tablet (75 mg total) by mouth daily as needed (at onset of migraine)    ondansetron (ZOFRAN-ODT) 4 mg disintegrating tablet; Take 1 tablet (4 mg total) by mouth every 6 (six) hours as needed for nausea or vomiting        Subjective:    HPI   28 yo F with PMHx allergic rhinitis who presents for f/u hemiplegic migraines    Interval Hx  Lost to f/u, last visit in . Over that period of time, she states that she had 0 migraines. She eventually d/c verapamil as she was migraine free. Over the last month, she has had 2 migraines in total. She describes the migraine starting with a visual aura (spot in vision, tunnel vision) followed by numbness over the L side of her body. She had another similar migraine this past Saturday. Previously prescribed reyvow, but she did not take the medication.     Last visit  She states that she  "started having migraines in high school, when she was about [age]. Her typical events start with her hands becoming weak and a spot in her vision for 10 min. This then progresses to numbness in her hands and face typically on the L side. At times, these symptoms also spread to her feet. She will sometimes have associated aphasia and slowing of cognitive function. Headaches start approximately 30 min after the onset of these symptoms and are localized primarily behind her eye. Can be bilateral with a L sided preference, 4/10 in intensity. Endorses associated photophobia and nausea. Symptoms can be exacerbated by exercise.  She typically takes 1 advil and goes to sleep when these symptoms start and they resolve after a few hours. Has 2-3 migraines a month and will need to call out of work when they occur during the day. Identifies caffeine and exercise as triggers, but migraines also somewhat relieved by caffeine after onset. She keeps     Mother and grandmother also have migraines with visual aura, but do not have the associated numbness/weakness/aphasia.     PMHx: allergic rhinitis and hemiplegic migraines  FHx: migraines in mother and grandmother  SocHx: nonsmoker. Works as a  with [employer].     Objective:    Blood pressure 110/60, pulse 70, height 5' 6\" (1.676 m), weight 61.2 kg (135 lb).    Physical Exam  Constitutional:       Appearance: Normal appearance.   HENT:      Head: Normocephalic and atraumatic.   Eyes:      Extraocular Movements: EOM normal. No nystagmus.   Cardiovascular:      Rate and Rhythm: Normal rate.   Musculoskeletal:         General: Normal range of motion.   Skin:     General: Skin is warm and dry.   Neurological:      Mental Status: She is alert.      Coordination: Coordination is intact.      Deep Tendon Reflexes: Strength normal.      Reflex Scores:       Tricep reflexes are 2+ on the right side and 2+ on the left side.       Bicep reflexes are 2+ on the right side and 2+ " on the left side.       Brachioradialis reflexes are 2+ on the right side and 2+ on the left side.       Patellar reflexes are 3+ on the right side and 3+ on the left side.  Psychiatric:         Mood and Affect: Mood normal.         Speech: Speech normal.         Neurological Exam  Mental Status  Alert. Oriented to person, place, time and situation. Recent and remote memory are intact. Speech is normal. Language is fluent with no aphasia. Attention and concentration are normal. Fund of knowledge is appropriate for level of education.    Cranial Nerves  CN II: Right visual acuity: normal. Left visual acuity: normal. Visual fields full to confrontation.  CN III, IV, VI: Extraocular movements intact bilaterally. No nystagmus. Normal saccades. Normal smooth pursuit.   Right pupil: 3 mm. Round. Reactive to light.   Left pupil: 3 mm. Round. Reactive to light.  CN V: Facial sensation is normal.  CN VII: Full and symmetric facial movement.  CN VIII: Hearing is normal.  CN IX, X: Palate elevates symmetrically  CN XI: Shoulder shrug strength is normal.  CN XII: Tongue midline without atrophy or fasciculations.    Motor  Normal muscle bulk throughout. No fasciculations present. Normal muscle tone. No abnormal involuntary movements. Strength is 5/5 throughout all four extremities.    Sensory  Sensation is intact to light touch, pinprick, vibration and proprioception in all four extremities.    Reflexes                                           Right                      Left  Brachioradialis                    2+                         2+  Biceps                                 2+                         2+  Triceps                                2+                         2+  Patellar                                3+                         3+    Coordination  Finger-to-nose, rapid alternating movements and heel-to-shin normal bilaterally without dysmetria.    Gait  Normal casual, toe, heel and tandem gait.     Review of  Systems   Constitutional:  Negative for appetite change, fatigue and fever.   HENT: Negative.  Negative for hearing loss, tinnitus, trouble swallowing and voice change.    Eyes:  Positive for visual disturbance. Negative for photophobia and pain.   Respiratory: Negative.  Negative for shortness of breath.    Cardiovascular: Negative.  Negative for palpitations.   Gastrointestinal:  Positive for nausea and vomiting.   Endocrine: Negative.  Negative for cold intolerance.   Genitourinary: Negative.  Negative for dysuria, frequency and urgency.   Musculoskeletal:  Negative for back pain, gait problem, myalgias, neck pain and neck stiffness.   Skin: Negative.  Negative for rash.   Allergic/Immunologic: Negative.    Neurological:  Positive for headaches. Negative for dizziness, tremors, seizures, syncope, facial asymmetry, speech difficulty, weakness, light-headedness and numbness.   Hematological: Negative.  Does not bruise/bleed easily.   Psychiatric/Behavioral: Negative.  Negative for confusion, hallucinations and sleep disturbance.     I have personally reviewed the MA's review of systems and made changes as necessary.    I have spent a total time of 30 minutes in caring for this patient on the day of the visit/encounter including Risks and benefits of tx options, Instructions for management, Patient and family education, Risk factor reductions, Impressions, Documenting in the medical record, Reviewing/placing orders in the medical record (including tests, medications, and/or procedures), and Obtaining or reviewing history  .

## 2025-07-09 ENCOUNTER — TELEPHONE (OUTPATIENT)
Age: 30
End: 2025-07-09

## 2025-07-09 NOTE — TELEPHONE ENCOUNTER
PA for Nurtec 75mg TBDP SUBMITTED to [email or web address]    via    []CMM-KEY:   [x]Surescripts-Case ID # [id number]   []Availity-Auth ID # NDC #   []Faxed to plan   []Other website   []Phone call Case ID #     [x]PA sent as URGENT    All office notes, labs and other pertaining documents and studies sent. Clinical questions answered. Awaiting determination from insurance company.     Turnaround time for your insurance to make a decision on your Prior Authorization can take 7-21 business days.

## 2025-07-14 NOTE — TELEPHONE ENCOUNTER
PA for Nurtec 75mg TBDP APPROVED     Date(s) approved 07/09/2025-07/09/20296      Patient advised by          [x]MyChart Message  []Phone call   []LMOM  [x]L/M to call office as no active Communication consent on file  []Unable to leave detailed message as VM not approved on Communication consent       Pharmacy advised by    [x]Fax  []Phone call  []Secure Chat    Specialty Pharmacy    []     Approval letter scanned into Media Yes